# Patient Record
Sex: MALE | Race: WHITE | NOT HISPANIC OR LATINO | Employment: FULL TIME | URBAN - METROPOLITAN AREA
[De-identification: names, ages, dates, MRNs, and addresses within clinical notes are randomized per-mention and may not be internally consistent; named-entity substitution may affect disease eponyms.]

---

## 2017-09-07 ENCOUNTER — OFFICE VISIT (OUTPATIENT)
Dept: FAMILY MEDICINE CLINIC | Facility: CLINIC | Age: 32
End: 2017-09-07

## 2017-09-07 VITALS
TEMPERATURE: 98.9 F | SYSTOLIC BLOOD PRESSURE: 110 MMHG | BODY MASS INDEX: 32.35 KG/M2 | RESPIRATION RATE: 18 BRPM | WEIGHT: 226 LBS | HEART RATE: 102 BPM | OXYGEN SATURATION: 98 % | HEIGHT: 70 IN | DIASTOLIC BLOOD PRESSURE: 78 MMHG

## 2017-09-07 DIAGNOSIS — Z86.59 H/O BIPOLAR DISORDER: ICD-10-CM

## 2017-09-07 DIAGNOSIS — F41.9 ANXIETY: ICD-10-CM

## 2017-09-07 DIAGNOSIS — F32.A DEPRESSION, UNSPECIFIED DEPRESSION TYPE: ICD-10-CM

## 2017-09-07 DIAGNOSIS — Z76.89 ENCOUNTER TO ESTABLISH CARE: Primary | ICD-10-CM

## 2017-09-07 DIAGNOSIS — Z72.0 TOBACCO ABUSE: ICD-10-CM

## 2017-09-07 DIAGNOSIS — Z20.2 POSSIBLE EXPOSURE TO STD: ICD-10-CM

## 2017-09-07 DIAGNOSIS — Z87.898 PERSONAL HISTORY OF MULTIPLE PULMONARY NODULES: ICD-10-CM

## 2017-09-07 PROCEDURE — 99406 BEHAV CHNG SMOKING 3-10 MIN: CPT | Performed by: NURSE PRACTITIONER

## 2017-09-07 PROCEDURE — 99203 OFFICE O/P NEW LOW 30 MIN: CPT | Performed by: NURSE PRACTITIONER

## 2017-09-07 RX ORDER — BUSPIRONE HYDROCHLORIDE 5 MG/1
5 TABLET ORAL 2 TIMES DAILY
Qty: 60 TABLET | Refills: 0 | Status: SHIPPED | OUTPATIENT
Start: 2017-09-07 | End: 2017-09-14 | Stop reason: SDUPTHER

## 2017-09-07 NOTE — PROGRESS NOTES
Subjective   Jose Conway is a 31 y.o. male.   Mr. Conway presents today to establish care and for evaluation/management of anxiety. The patient reports that he has had anxiety issues since childhood and was at one time diagnosed with bipolar disorder. Mr. Conway states that his anxiety has been worsening recently and has begun to interfere in his ability to work and perform daily activities.    Anxiety   Presents for initial visit. Onset was more than 5 years ago. The problem has been gradually worsening. Symptoms include decreased concentration, depressed mood, excessive worry, insomnia, malaise, nausea, nervous/anxious behavior, panic and restlessness. Patient reports no chest pain, compulsions, confusion, dizziness, dry mouth, feeling of choking, hyperventilation, irritability, obsessions, palpitations, shortness of breath or suicidal ideas. Symptoms occur most days. The severity of symptoms is moderate and interfering with daily activities. The quality of sleep is poor.     Risk factors include family history. His past medical history is significant for anxiety/panic attacks and depression. There is no history of suicide attempts. Past treatments include nothing.   Depression   Visit Type: initial  Onset of symptoms: more than 5 years ago  Progression since onset: gradually worsening  Patient presents with the following symptoms: decreased concentration, depressed mood, excessive worry, fatigue, insomnia, malaise, nervousness/anxiety, panic and restlessness.  Patient is not experiencing: chest pain, choking sensation, compulsions, confusion, dry mouth, hyperventilation, irritability, obsessions, palpitations, shortness of breath and suicidal ideas.  Frequency of symptoms: most days   Severity: moderate   Risk factors: family history  Patient has a history of: anxiety/panic attacks and bipolar disorder  Treatment tried: nothing          The following portions of the patient's history were reviewed and updated as  appropriate: allergies, current medications, past family history, past medical history, past social history, past surgical history and problem list.    Review of Systems   Constitutional: Positive for activity change, appetite change and fatigue. Negative for chills, diaphoresis, fever and irritability.   HENT: Negative.    Eyes: Negative.    Respiratory: Negative for cough, choking, chest tightness and shortness of breath.    Cardiovascular: Negative for chest pain, palpitations and leg swelling.   Gastrointestinal: Positive for diarrhea (when feels anxious) and nausea. Negative for abdominal pain and vomiting.   Endocrine: Negative.    Genitourinary: Negative.    Musculoskeletal: Negative.    Skin: Negative.    Allergic/Immunologic: Negative.    Neurological: Negative for dizziness, light-headedness and headaches.   Hematological: Negative.    Psychiatric/Behavioral: Positive for decreased concentration. Negative for confusion, self-injury and suicidal ideas. The patient is nervous/anxious and has insomnia.        Objective   Physical Exam   Constitutional: He is oriented to person, place, and time. He appears well-developed and well-nourished. He is cooperative. No distress.   HENT:   Head: Normocephalic and atraumatic.   Right Ear: Tympanic membrane and external ear normal.   Left Ear: Tympanic membrane and external ear normal.   Nose: Nose normal.   Mouth/Throat: Uvula is midline, oropharynx is clear and moist and mucous membranes are normal.   Eyes: Pupils are equal, round, and reactive to light. No scleral icterus.   Neck: Normal range of motion. Neck supple. No thyromegaly present.   Cardiovascular: Regular rhythm, S1 normal, S2 normal, normal heart sounds and intact distal pulses.    Mild tachycardia   Pulmonary/Chest: Effort normal and breath sounds normal. No respiratory distress. He has no decreased breath sounds. He has no wheezes. He has no rhonchi. He has no rales.   Abdominal: Soft. He exhibits no  distension and no mass. Bowel sounds are decreased. There is no tenderness. There is no rigidity, no rebound, no guarding, no CVA tenderness and negative Belcher's sign.   Lymphadenopathy:     He has no cervical adenopathy.   Neurological: He is alert and oriented to person, place, and time.   Skin: Skin is warm and dry. He is not diaphoretic.   Psychiatric: His speech is normal and behavior is normal. Judgment and thought content normal. His mood appears anxious. His affect is not angry, not blunt, not labile and not inappropriate. He is not actively hallucinating. Cognition and memory are normal. He does not exhibit a depressed mood. He is attentive.   Vitals reviewed.      Assessment/Plan   Jose was seen today for establish care.    Diagnoses and all orders for this visit:    Encounter to establish care  -     Comprehensive Metabolic Panel  -     TSH  -     CBC Auto Differential  -     Hemoglobin A1c  -     Lipid Panel  -     POC Urinalysis Dipstick, Automated  -     Vitamin B12  -     Vitamin D 25 Hydroxy  -     Hepatitis Panel, Acute  -     HIV-1 / O / 2 Ag / Antibody 4th Generation  -     RPR  -     Chlamydia trachomatis, Neisseria gonorrhoeae, Trichomonas vaginalis, PCR  -     Vitamin B1, Whole Blood; Future    Anxiety  -     busPIRone (BUSPAR) 5 MG tablet; Take 1 tablet by mouth 2 (Two) Times a Day.  -     sertraline (ZOLOFT) 50 MG tablet; Take 1 tablet by mouth Daily.  -     Ambulatory Referral to Behavioral Health    Depression, unspecified depression type  -     sertraline (ZOLOFT) 50 MG tablet; Take 1 tablet by mouth Daily.  -     Ambulatory Referral to Behavioral Health    Tobacco abuse  -     XR Chest PA & Lateral; Future    Personal history of multiple pulmonary nodules  -     XR Chest PA & Lateral; Future    H/O bipolar disorder  -     Ambulatory Referral to [facility]al [facility]    Possible exposure to STD  -     Hepatitis Panel, Acute  -     HIV-1 / O / 2 Ag / Antibody 4th Generation  -     RPR  -      Chlamydia trachomatis, Neisseria gonorrhoeae, Trichomonas vaginalis, PCR    Discussed the nature of the medical conditions including, risks, complications, implications, management, safe and proper use of medications. Encouraged therapeutic lifestyle changes including low calorie diet with plenty of fruits and vegetables, daily exercise, medication compliance, and keeping scheduled follow up appointments with me and any other providers. Encouraged patient to have appointment for complete physical, fasting labs, appropriate screenings, and immunizations on an annual basis.    I personally spent over half of a total 40 minutes face to face with the patient in counseling and discussion and/or coordination of care as described above.     Encouraged smoking cessation. Patient counseled for 3-10 minutes on the risks, complications and implications of long term use including heart disease, stroke and increased risk of many cancers. Offered behavioral therapy, social support, and medication therapies. Patient will consider and discuss at next visit.

## 2017-09-07 NOTE — PATIENT INSTRUCTIONS
Generalized Anxiety Disorder  Generalized anxiety disorder (ROSA) is a mental disorder. It interferes with life functions, including relationships, work, and school.  ROSA is different from normal anxiety, which everyone experiences at some point in their lives in response to specific life events and activities. Normal anxiety actually helps us prepare for and get through these life events and activities. Normal anxiety goes away after the event or activity is over.   ROSA causes anxiety that is not necessarily related to specific events or activities. It also causes excess anxiety in proportion to specific events or activities. The anxiety associated with ROSA is also difficult to control. ROSA can vary from mild to severe. People with severe ROSA can have intense waves of anxiety with physical symptoms (panic attacks).   SYMPTOMS  The anxiety and worry associated with ROSA are difficult to control. This anxiety and worry are related to many life events and activities and also occur more days than not for 6 months or longer. People with ROSA also have three or more of the following symptoms (one or more in children):  · Restlessness.    · Fatigue.  · Difficulty concentrating.    · Irritability.  · Muscle tension.  · Difficulty sleeping or unsatisfying sleep.  DIAGNOSIS  ROSA is diagnosed through an assessment by your health care provider. Your health care provider will ask you questions about your mood, physical symptoms, and events in your life. Your health care provider may ask you about your medical history and use of alcohol or drugs, including prescription medicines. Your health care provider may also do a physical exam and blood tests. Certain medical conditions and the use of certain substances can cause symptoms similar to those associated with ROSA. Your health care provider may refer you to a mental health specialist for further evaluation.  TREATMENT  The following therapies are usually used to treat ROSA:    · Medication. Antidepressant medication usually is prescribed for long-term daily control. Antianxiety medicines may be added in severe cases, especially when panic attacks occur.    · Talk therapy (psychotherapy). Certain types of talk therapy can be helpful in treating ROSA by providing support, education, and guidance. A form of talk therapy called cognitive behavioral therapy can teach you healthy ways to think about and react to daily life events and activities.  · Stress management techniques. These include yoga, meditation, and exercise and can be very helpful when they are practiced regularly.  A mental health specialist can help determine which treatment is best for you. Some people see improvement with one therapy. However, other people require a combination of therapies.     This information is not intended to replace advice given to you by your health care provider. Make sure you discuss any questions you have with your health care provider.     Document Released: 04/14/2014 Document Revised: 01/08/2016 Document Reviewed: 04/14/2014  [employer] Interactive Patient Education ©2017 [employer]  Health Maintenance, Male  A healthy lifestyle and preventative care can promote health and wellness.  · Maintain regular health, dental, and eye exams.  · Eat a healthy diet. Foods like vegetables, fruits, whole grains, low-fat dairy products, and lean protein foods contain the nutrients you need and are low in calories. Decrease your intake of foods high in solid fats, added sugars, and salt. Get information about a proper diet from your health care provider, if necessary.  · Regular physical exercise is one of the most important things you can do for your health. Most adults should get at least 150 minutes of moderate-intensity exercise (any activity that increases your heart rate and causes you to sweat) each week. In addition, most adults need muscle-strengthening exercises on 2 or more days a week.     · Maintain a healthy weight. The body mass index (BMI) is a screening tool to identify possible weight problems. It provides an estimate of body fat based on height and weight. Your health care provider can find your BMI and can help you achieve or maintain a healthy weight. For males 20 years and older:    A BMI below 18.5 is considered underweight.    A BMI of 18.5 to 24.9 is normal.    A BMI of 25 to 29.9 is considered overweight.    A BMI of 30 and above is considered obese.  · Maintain normal blood lipids and cholesterol by exercising and minimizing your intake of saturated fat. Eat a balanced diet with plenty of fruits and vegetables. Blood tests for lipids and cholesterol should begin at age 20 and be repeated every 5 years. If your lipid or cholesterol levels are high, you are over age 50, or you are at high risk for heart disease, you may need your cholesterol levels checked more frequently. Ongoing high lipid and cholesterol levels should be treated with medicines if diet and exercise are not working.  · If you smoke, find out from your health care provider how to quit. If you do not use tobacco, do not start.  · Lung cancer screening is recommended for adults aged 55-80 years who are at high risk for developing lung cancer because of a history of smoking. A yearly low-dose CT scan of the lungs is recommended for people who have at least a 30-pack-year history of smoking and are current smokers or have quit within the past 15 years. A pack year of smoking is smoking an average of 1 pack of cigarettes a day for 1 year (for example, a 30-pack-year history of smoking could mean smoking 1 pack a day for 30 years or 2 packs a day for 15 years). Yearly screening should continue until the smoker has stopped smoking for at least 15 years. Yearly screening should be stopped for people who develop a health problem that would prevent them from having lung cancer treatment.  · If you choose to drink alcohol, do not  have more than 2 drinks per day. One drink is considered to be 12 oz (360 mL) of beer, 5 oz (150 mL) of wine, or 1.5 oz (45 mL) of liquor.  · Avoid the use of street drugs. Do not share needles with anyone. Ask for help if you need support or instructions about stopping the use of drugs.  · High blood pressure causes heart disease and increases the risk of stroke. High blood pressure is more likely to develop in:    People who have blood pressure in the end of the normal range (100-139/85-89 mm Hg).    People who are overweight or obese.    People who are .  · If you are 18-39 years of age, have your blood pressure checked every 3-5 years. If you are 40 years of age or older, have your blood pressure checked every year. You should have your blood pressure measured twice--once when you are at a hospital or clinic, and once when you are not at a hospital or clinic. Record the average of the two measurements. To check your blood pressure when you are not at a hospital or clinic, you can use:    An automated blood pressure machine at a pharmacy.    A home blood pressure monitor.  · If you are 45-79 years old, ask your health care provider if you should take aspirin to prevent heart disease.  · Diabetes screening involves taking a blood sample to check your fasting blood sugar level. This should be done once every 3 years after age 45 if you are at a normal weight and without risk factors for diabetes. Testing should be considered at a younger age or be carried out more frequently if you are overweight and have at least 1 risk factor for diabetes.  · Colorectal cancer can be detected and often prevented. Most routine colorectal cancer screening begins at the age of 50 and continues through age 75. However, your health care provider may recommend screening at an earlier age if you have risk factors for colon cancer. On a yearly basis, your health care provider may provide home test kits to check for hidden  blood in the stool. A small camera at the end of a tube may be used to directly examine the colon (sigmoidoscopy or colonoscopy) to detect the earliest forms of colorectal cancer. Talk to your health care provider about this at age 50 when routine screening begins. A direct exam of the colon should be repeated every 5-10 years through age 75, unless early forms of precancerous polyps or small growths are found.  · People who are at an increased risk for hepatitis B should be screened for this virus. You are considered at high risk for hepatitis B if:    You were born in a country where hepatitis B occurs often. Talk with your health care provider about which countries are considered high risk.    Your parents were born in a high-risk country and you have not received a shot to protect against hepatitis B (hepatitis B vaccine).    You have HIV or AIDS.    You use needles to inject street drugs.    You live with, or have sex with, someone who has hepatitis B.    You are a man who has sex with other men (MSM).    You get hemodialysis treatment.    You take certain medicines for conditions like cancer, organ transplantation, and autoimmune conditions.  · Hepatitis C blood testing is recommended for all people born from 1945 through 1965 and any individual with known risk factors for hepatitis C.  · Healthy men should no longer receive prostate-specific antigen (PSA) blood tests as part of routine cancer screening. Talk to your health care provider about prostate cancer screening.  · Testicular cancer screening is not recommended for adolescents or adult males who have no symptoms. Screening includes self-exam, a health care provider exam, and other screening tests. Consult with your health care provider about any symptoms you have or any concerns you have about testicular cancer.  · Practice safe sex. Use condoms and avoid high-risk sexual practices to reduce the spread of sexually transmitted infections (STIs).  · You  should be screened for STIs, including gonorrhea and chlamydia if:    You are sexually active and are younger than 24 years.    You are older than 24 years, and your health care provider tells you that you are at risk for this type of infection.    Your sexual activity has changed since you were last screened, and you are at an increased risk for chlamydia or gonorrhea. Ask your health care provider if you are at risk.  · If you are at risk of being infected with HIV, it is recommended that you take a prescription medicine daily to prevent HIV infection. This is called pre-exposure prophylaxis (PrEP). You are considered at risk if:    You are a man who has sex with other men (MSM).    You are a heterosexual man who is sexually active with multiple partners.    You take drugs by injection.    You are sexually active with a partner who has HIV.    Talk with your health care provider about whether you are at high risk of being infected with HIV. If you choose to begin PrEP, you should first be tested for HIV. You should then be tested every 3 months for as long as you are taking PrEP.  · Use sunscreen. Apply sunscreen liberally and repeatedly throughout the day. You should seek shade when your shadow is shorter than you. Protect yourself by wearing long sleeves, pants, a wide-brimmed hat, and sunglasses year round whenever you are outdoors.  · Tell your health care provider of new moles or changes in moles, especially if there is a change in shape or color. Also, tell your health care provider if a mole is larger than the size of a pencil eraser.  · A one-time screening for abdominal aortic aneurysm (AAA) and surgical repair of large AAAs by ultrasound is recommended for men aged 65-75 years who are current or former smokers.  · Stay current with your vaccines (immunizations).     This information is not intended to replace advice given to you by your health care provider. Make sure you discuss any questions you have  with your health care provider.     Document Released: 06/15/2009 Document Revised: 01/08/2016 Document Reviewed: 09/20/2016  [employer] Interactive Patient Education ©2017 [employer]  Major Depressive Disorder  Major depressive disorder is a mental illness. It also may be called clinical depression or unipolar depression. Major depressive disorder usually causes feelings of sadness, hopelessness, or helplessness. Some people with this disorder do not feel particularly sad but lose interest in doing things they used to enjoy (anhedonia). Major depressive disorder also can cause physical symptoms. It can interfere with work, school, relationships, and other normal everyday activities. The disorder varies in severity but is longer lasting and more serious than the sadness we all feel from time to time in our lives.  Major depressive disorder often is triggered by stressful life events or major life changes. Examples of these triggers include divorce, loss of your job or home, a move, and the death of a family member or close friend. Sometimes this disorder occurs for no obvious reason at all. People who have family members with major depressive disorder or bipolar disorder are at higher risk for developing this disorder, with or without life stressors. Major depressive disorder can occur at any age. It may occur just once in your life (single episode major depressive disorder). It may occur multiple times (recurrent major depressive disorder).  SYMPTOMS  People with major depressive disorder have either anhedonia or depressed mood on nearly a daily basis for at least 2 weeks or longer. Symptoms of depressed mood include:  · Feelings of sadness (blue or down in the dumps) or emptiness.  · Feelings of hopelessness or helplessness.  · Tearfulness or episodes of crying (may be observed by others).  · Irritability (children and adolescents).  In addition to depressed mood or anhedonia or both, people with this disorder  have at least four of the following symptoms:  · Difficulty sleeping or sleeping too much.    · Significant change (increase or decrease) in appetite or weight.    · Lack of energy or motivation.  · Feelings of guilt and worthlessness.    · Difficulty concentrating, remembering, or making decisions.  · Unusually slow movement (psychomotor retardation) or restlessness (as observed by others).    · Recurrent wishes for death, recurrent thoughts of self-harm (suicide), or a suicide attempt.  People with major depressive disorder commonly have persistent negative thoughts about themselves, other people, and the world. People with severe major depressive disorder may experience distorted beliefs or perceptions about the world (psychotic delusions). They also may see or hear things that are not real (psychotic hallucinations).  DIAGNOSIS  Major depressive disorder is diagnosed through an assessment by your health care provider. Your health care provider will ask about aspects of your daily life, such as mood, sleep, and appetite, to see if you have the diagnostic symptoms of major depressive disorder. Your health care provider may ask about your medical history and use of alcohol or drugs, including prescription medicines. Your health care provider also may do a physical exam and blood work. This is because certain medical conditions and the use of certain substances can cause major depressive disorder-like symptoms (secondary depression). Your health care provider also may refer you to a mental health specialist for further evaluation and treatment.  TREATMENT  It is important to recognize the symptoms of major depressive disorder and seek treatment. The following treatments can be prescribed for this disorder:    · Medicine. Antidepressant medicines usually are prescribed. Antidepressant medicines are thought to correct chemical imbalances in the brain that are commonly associated with major depressive disorder. Other  types of medicine may be added if the symptoms do not respond to antidepressant medicines alone or if psychotic delusions or hallucinations occur.  · Talk therapy. Talk therapy can be helpful in treating major depressive disorder by providing support, education, and guidance. Certain types of talk therapy also can help with negative thinking (cognitive behavioral therapy) and with relationship issues that trigger this disorder (interpersonal therapy).  A mental health specialist can help determine which treatment is best for you. Most people with major depressive disorder do well with a combination of medicine and talk therapy. Treatments involving electrical stimulation of the brain can be used in situations with extremely severe symptoms or when medicine and talk therapy do not work over time. These treatments include electroconvulsive therapy, transcranial magnetic stimulation, and vagal nerve stimulation.     This information is not intended to replace advice given to you by your health care provider. Make sure you discuss any questions you have with your health care provider.     Document Released: 04/14/2014 Document Revised: 01/08/2016 Document Reviewed: 04/14/2014  [employer] Interactive Patient Education ©2017 [employer]    Pulmonary Nodule  A pulmonary nodule is a small, round growth of tissue in the lung. Pulmonary nodules can range in size from less than 1/5 inch (4 mm) to a little bigger than an inch (25 mm). Most pulmonary nodules are detected when imaging tests of the lung are being performed for a different problem. Pulmonary nodules are usually not cancerous (benign). However, some pulmonary nodules are cancerous (malignant). Follow-up treatment or testing is based on the size of the pulmonary nodule and your risk of getting lung cancer.   CAUSES  Benign pulmonary nodules can be caused by various things. Some of the causes include:   · Bacterial, fungal, or viral infections. This is usually an old  infection that is no longer active, but it can sometimes be a current, active infection.  · A benign mass of tissue.  · Inflammation from conditions such as rheumatoid arthritis.    · Abnormal blood vessels in the lungs.  Malignant pulmonary nodules can result from lung cancer or from cancers that spread to the lung from other places in the body.  SIGNS AND SYMPTOMS  Pulmonary nodules usually do not cause symptoms.  DIAGNOSIS  Most often, pulmonary nodules are found incidentally when an X-ray or CT scan is performed to look for some other problem in the lung area. To help determine whether a pulmonary nodule is benign or malignant, your health care provider will take a medical history and order a variety of tests. Tests done may include:   · Blood tests.  · A skin test called a tuberculin test. This test is used to determine if you have been exposed to the germ that causes tuberculosis.    · Chest X-rays. If possible, a new X-ray may be compared with X-rays you have had in the past.     · CT scan. This test shows smaller pulmonary nodules more clearly than an X-ray.    · Positron emission tomography (PET) scan. In this test, a safe amount of a radioactive substance is injected into the bloodstream. Then, the scan takes a picture of the pulmonary nodule. The radioactive substance is eliminated from your body in your urine.    · Biopsy. A tiny piece of the pulmonary nodule is removed so it can be checked under a microscope.  TREATMENT   Pulmonary nodules that are benign normally do not require any treatment because they usually do not cause symptoms or breathing problems. Your health care provider may want to monitor the pulmonary nodule through follow-up CT scans. The frequency of these CT scans will vary based on the size of the nodule and the risk factors for lung cancer. For example, CT scans will need to be done more frequently if the pulmonary nodule is larger and if you have a history of smoking and a family  history of cancer. Further testing or biopsies may be done if any follow-up CT scan shows that the size of the pulmonary nodule has increased.  HOME CARE INSTRUCTIONS  · Only take over-the-counter or prescription medicines as directed by your health care provider.  · Keep all follow-up appointments with your health care provider.  SEEK MEDICAL CARE IF:  · You have trouble breathing when you are active.    · You feel sick or unusually tired.    · You do not feel like eating.    · You lose weight without trying to.    · You develop chills or night sweats.    SEEK IMMEDIATE MEDICAL CARE IF:  · You cannot catch your breath, or you begin wheezing.    · You cannot stop coughing.    · You cough up blood.    · You become dizzy or feel like you are going to pass out.    · You have sudden chest pain.    · You have a fever or persistent symptoms for more than 2-3 days.    · You have a fever and your symptoms suddenly get worse.  MAKE SURE YOU:  · Understand these instructions.  · Will watch your condition.  · Will get help right away if you are not doing well or get worse.     This information is not intended to replace advice given to you by your health care provider. Make sure you discuss any questions you have with your health care provider.     Document Released: 10/15/2010 Document Revised: 08/20/2014 Document Reviewed: 06/09/2014  [employer] Interactive Patient Education ©2017 [employer]    Smoking Hazards  Smoking cigarettes is extremely bad for your health. Tobacco smoke has over 200 known poisons in it. It contains the poisonous gases nitrogen oxide and carbon monoxide. There are over 60 chemicals in tobacco smoke that cause cancer. Some of the chemicals found in cigarette smoke include:   · Cyanide.    · Benzene.    · Formaldehyde.    · Methanol (wood alcohol).    · Acetylene (fuel used in welding torches).    · Ammonia.    Even smoking lightly shortens your life expectancy by several years. You can greatly reduce  the risk of medical problems for you and your family by stopping now. Smoking is the most preventable cause of death and disease in our society. Within days of quitting smoking, your circulation improves, you decrease the risk of having a heart attack, and your lung capacity improves. There may be some increased phlegm in the first few days after quitting, and it may take months for your lungs to clear up completely. Quitting for 10 years reduces your risk of developing lung cancer to almost that of a nonsmoker.   WHAT ARE THE RISKS OF SMOKING?  Cigarette smokers have an increased risk of many serious medical problems, including:  · Lung cancer.    · Lung disease (such as pneumonia, bronchitis, and emphysema).    · Heart attack and chest pain due to the heart not getting enough oxygen (angina).    · Heart disease and peripheral blood vessel disease.    · Hypertension.    · Stroke.    · Oral cancer (cancer of the lip, mouth, or voice box).    · Bladder cancer.    · Pancreatic cancer.    · Cervical cancer.    · Pregnancy complications, including premature birth.    · Stillbirths and smaller  babies, birth defects, and genetic damage to sperm.    · Early menopause.    · Lower estrogen level for women.    · Infertility.    · Facial wrinkles.    · Blindness.    · Increased risk of broken bones (fractures).    · Senile dementia.    · Stomach ulcers and internal bleeding.    · Delayed wound healing and increased risk of complications during surgery.  Because of secondhand smoke exposure, children of smokers have an increased risk of the following:   · Sudden infant death syndrome (SIDS).    · Respiratory infections.    · Lung cancer.    · Heart disease.    · Ear infections.    WHY IS SMOKING ADDICTIVE?  Nicotine is the chemical agent in tobacco that is capable of causing addiction or dependence. When you smoke and inhale, nicotine is absorbed rapidly into the bloodstream through your lungs. Both inhaled and  noninhaled nicotine may be addictive.   WHAT ARE THE BENEFITS OF QUITTING?   There are many health benefits to quitting smoking. Some are:   · The likelihood of developing cancer and heart disease decreases. Health improvements are seen almost immediately.    · Blood pressure, pulse rate, and breathing patterns start returning to normal soon after quitting.    · People who quit may see an improvement in their overall quality of life.    HOW DO YOU QUIT SMOKING?  Smoking is an addiction with both physical and psychological effects, and longtime habits can be hard to change. Your health care provider can recommend:  · Programs and community resources, which may include group support, education, or therapy.  · Replacement products, such as patches, gum, and nasal sprays. Use these products only as directed. Do not replace cigarette smoking with electronic cigarettes (commonly called e-cigarettes). The safety of e-cigarettes is unknown, and some may contain harmful chemicals.  FOR MORE INFORMATION  · American Lung Association: www.lung.org  · American Cancer Society: www.cancer.org     This information is not intended to replace advice given to you by your health care provider. Make sure you discuss any questions you have with your health care provider.     Document Released: 01/25/2006 Document Revised: 04/10/2017 Document Reviewed: 06/09/2014  [employer] Patient Education ©2017 [employer]  Buspirone tablets  What is this medicine?  BUSPIRONE (byspencer duran) is used to treat anxiety disorders.  This medicine may be used for other purposes; ask your health care provider or pharmacist if you have questions.  COMMON BRAND NAME(S): BuSpar  What should I tell my health care provider before I take this medicine?  They need to know if you have any of these conditions:  -kidney or liver disease  -an unusual or allergic reaction to buspirone, other medicines, foods, dyes, or preservatives  -pregnant or trying to  get pregnant  -breast-feeding  How should I use this medicine?  Take this medicine by mouth with a glass of water. Follow the directions on the prescription label. You may take this medicine with or without food. To ensure that this medicine always works the same way for you, you should take it either always with or always without food. Take your doses at regular intervals. Do not take your medicine more often than directed. Do not stop taking except on the advice of your doctor or health care professional.  Talk to your pediatrician regarding the use of this medicine in children. Special care may be needed.  Overdosage: If you think you have taken too much of this medicine contact a poison control center or emergency room at once.  NOTE: This medicine is only for you. Do not share this medicine with others.  What if I miss a dose?  If you miss a dose, take it as soon as you can. If it is almost time for your next dose, take only that dose. Do not take double or extra doses.  What may interact with this medicine?  Do not take this medicine with any of the following medications:  -linezolid  -MAOIs like Carbex, Eldepryl, Marplan, Nardil, and Parnate  -methylene blue  -procarbazine  This medicine may also interact with the following medications:  -diazepam  -digoxin  -diltiazem  -erythromycin  -grapefruit juice  -haloperidol  -medicines for mental depression or mood problems  -medicines for seizures like carbamazepine, phenobarbital and phenytoin  -nefazodone  -other medications for anxiety  -rifampin  -ritonavir  -some antifungal medicines like itraconazole, ketoconazole, and voriconazole  -verapamil  -warfarin  This list may not describe all possible interactions. Give your health care provider a list of all the medicines, herbs, non-prescription drugs, or dietary supplements you use. Also tell them if you smoke, drink alcohol, or use illegal drugs. Some items may interact with your medicine.  What should I watch  for while using this medicine?  Visit your doctor or health care professional for regular checks on your progress. It may take 1 to 2 weeks before your anxiety gets better.  You may get drowsy or dizzy. Do not drive, use machinery, or do anything that needs mental alertness until you know how this drug affects you. Do not stand or sit up quickly, especially if you are an older patient. This reduces the risk of dizzy or fainting spells. Alcohol can make you more drowsy and dizzy. Avoid alcoholic drinks.  What side effects may I notice from receiving this medicine?  Side effects that you should report to your doctor or health care professional as soon as possible:  -blurred vision or other vision changes  -chest pain  -confusion  -difficulty breathing  -feelings of hostility or anger  -muscle aches and pains  -numbness or tingling in hands or feet  -ringing in the ears  -skin rash and itching  -vomiting  -weakness  Side effects that usually do not require medical attention (report to your doctor or health care professional if they continue or are bothersome):  -disturbed dreams, nightmares  -headache  -nausea  -restlessness or nervousness  -sore throat and nasal congestion  -stomach upset  This list may not describe all possible side effects. Call your doctor for medical advice about side effects. You may report side effects to FDA at 3-471-FDA-0256.  Where should I keep my medicine?  Keep out of the reach of children.  Store at room temperature below 30 degrees C (86 degrees F). Protect from light. Keep container tightly closed. Throw away any unused medicine after the expiration date.  NOTE: This sheet is a summary. It may not cover all possible information. If you have questions about this medicine, talk to your doctor, pharmacist, or health care provider.     © 2017, [employer]. (2011-07-28 18:06:11)  Sertraline tablets  What is this medicine?  SERTRALINE (SER tra li) is used to treat depression. It  may also be used to treat obsessive compulsive disorder, panic disorder, post-trauma stress, premenstrual dysphoric disorder (PMDD) or social anxiety.  This medicine may be used for other purposes; ask your health care provider or pharmacist if you have questions.  COMMON BRAND NAME(S): Zoloft  What should I tell my health care provider before I take this medicine?  They need to know if you have any of these conditions:  -bleeding disorders  -bipolar disorder or a family history of bipolar disorder  -glaucoma  -heart disease  -high blood pressure  -history of irregular heartbeat  -history of low levels of calcium, magnesium, or potassium in the blood  -if you often drink alcohol  -liver disease  -receiving electroconvulsive therapy  -seizures  -suicidal thoughts, plans, or attempt; a previous suicide attempt by you or a family member  -take medicines that treat or prevent blood clots  -thyroid disease  -an unusual or allergic reaction to sertraline, other medicines, foods, dyes, or preservatives  -pregnant or trying to get pregnant  -breast-feeding  How should I use this medicine?  Take this medicine by mouth with a glass of water. Follow the directions on the prescription label. You can take it with or without food. Take your medicine at regular intervals. Do not take your medicine more often than directed. Do not stop taking this medicine suddenly except upon the advice of your doctor. Stopping this medicine too quickly may cause serious side effects or your condition may worsen.  A special MedGuide will be given to you by the pharmacist with each prescription and refill. Be sure to read this information carefully each time.  Talk to your pediatrician regarding the use of this medicine in children. While this drug may be prescribed for children as young as 7 years for selected conditions, precautions do apply.  Overdosage: If you think you have taken too much of this medicine contact a poison control center or  emergency room at once.  NOTE: This medicine is only for you. Do not share this medicine with others.  What if I miss a dose?  If you miss a dose, take it as soon as you can. If it is almost time for your next dose, take only that dose. Do not take double or extra doses.  What may interact with this medicine?  Do not take this medicine with any of the following medications:  -certain medicines for fungal infections like fluconazole, itraconazole, ketoconazole, posaconazole, voriconazole  -cisapride  -disulfiram  -dofetilide  -linezolid  -MAOIs like Carbex, Eldepryl, Marplan, Nardil, and Parnate  -metronidazole  -methylene blue (injected into a vein)  -pimozide  -thioridazine  -ziprasidone  This medicine may also interact with the following medications:  -alcohol  -amphetamines  -aspirin and aspirin-like medicines  -certain medicines for depression, anxiety, or psychotic disturbances  -certain medicines for irregular heart beat like flecainide, propafenone  -certain medicines for migraine headaches like almotriptan, eletriptan, frovatriptan, naratriptan, rizatriptan, sumatriptan, zolmitriptan  -certain medicines for sleep  -certain medicines for seizures like carbamazepine, valproic acid, phenytoin  -certain medicines that treat or prevent blood clots like warfarin, enoxaparin, dalteparin  -cimetidine  -digoxin  -diuretics  -fentanyl  -furazolidone  -isoniazid  -lithium  -NSAIDs, medicines for pain and inflammation, like ibuprofen or naproxen  -other medicines that prolong the QT interval (cause an abnormal heart rhythm)  -procarbazine  -rasagiline  -supplements like [place] wort, kava kava, valerian  -tolbutamide  -tramadol  -tryptophan  This list may not describe all possible interactions. Give your health care provider a list of all the medicines, herbs, non-prescription drugs, or dietary supplements you use. Also tell them if you smoke, drink alcohol, or use illegal drugs. Some items may interact with your  medicine.  What should I watch for while using this medicine?  Tell your doctor if your symptoms do not get better or if they get worse. Visit your doctor or health care professional for regular checks on your progress. Because it may take several weeks to see the full effects of this medicine, it is important to continue your treatment as prescribed by your doctor.  Patients and their families should watch out for new or worsening thoughts of suicide or depression. Also watch out for sudden changes in feelings such as feeling anxious, agitated, panicky, irritable, hostile, aggressive, impulsive, severely restless, overly excited and hyperactive, or not being able to sleep. If this happens, especially at the beginning of treatment or after a change in dose, call your health care professional.  You may get drowsy or dizzy. Do not drive, use machinery, or do anything that needs mental alertness until you know how this medicine affects you. Do not stand or sit up quickly, especially if you are an older patient. This reduces the risk of dizzy or fainting spells. Alcohol may interfere with the effect of this medicine. Avoid alcoholic drinks.  Your mouth may get dry. Chewing sugarless gum or sucking hard candy, and drinking plenty of water may help. Contact your doctor if the problem does not go away or is severe.  What side effects may I notice from receiving this medicine?  Side effects that you should report to your doctor or health care professional as soon as possible:  -allergic reactions like skin rash, itching or hives, swelling of the face, lips, or tongue  -black, tarry stools  -changes in vision  -confusion  -eye pain  -fast, irregular heartbeat  -feeling faint or lightheaded, falls  -hallucination, loss of contact with reality  -loss of balance or coordination  -loss of memory  -restlessness, pacing, inability to keep still  -seizures  -stiff muscles  -suicidal thoughts or other mood changes  -trouble  sleeping  -unusual bleeding or bruising  -unusually weak or tired  -vomiting  Side effects that usually do not require medical attention (report to your doctor or health care professional if they continue or are bothersome):  -anxious  -change in appetite or weight  -change in sex drive or performance  -diarrhea  -increased sweating  -indigestion, nausea  -tremors  This list may not describe all possible side effects. Call your doctor for medical advice about side effects. You may report side effects to FDA at 9-909-YJC-8315.  Where should I keep my medicine?  Keep out of the reach of children.  Store at room temperature between 15 and 30 degrees C (59 and 86 degrees F). Throw away any unused medicine after the expiration date.  NOTE: This sheet is a summary. It may not cover all possible information. If you have questions about this medicine, talk to your doctor, pharmacist, or health care provider.     © 2017, [employer]. (2017-02-07 11:57:26)

## 2017-09-08 ENCOUNTER — HOSPITAL ENCOUNTER (OUTPATIENT)
Dept: GENERAL RADIOLOGY | Facility: HOSPITAL | Age: 32
Discharge: HOME | End: 2017-09-08
Admitting: NURSE PRACTITIONER

## 2017-09-08 DIAGNOSIS — Z72.0 TOBACCO ABUSE: ICD-10-CM

## 2017-09-08 DIAGNOSIS — Z87.898 PERSONAL HISTORY OF MULTIPLE PULMONARY NODULES: ICD-10-CM

## 2017-09-08 PROCEDURE — 71020 HC CHEST PA AND LATERAL: CPT

## 2017-09-10 DIAGNOSIS — R91.1 PULMONARY NODULE: Primary | ICD-10-CM

## 2017-09-11 DIAGNOSIS — R91.1 LUNG NODULE: Primary | ICD-10-CM

## 2017-09-12 ENCOUNTER — RESULTS ENCOUNTER (OUTPATIENT)
Dept: FAMILY MEDICINE CLINIC | Facility: CLINIC | Age: 32
End: 2017-09-12

## 2017-09-12 DIAGNOSIS — Z76.89 ENCOUNTER TO ESTABLISH CARE: ICD-10-CM

## 2017-09-14 ENCOUNTER — OFFICE VISIT (OUTPATIENT)
Dept: FAMILY MEDICINE CLINIC | Facility: CLINIC | Age: 32
End: 2017-09-14

## 2017-09-14 VITALS
WEIGHT: 226 LBS | HEART RATE: 83 BPM | SYSTOLIC BLOOD PRESSURE: 110 MMHG | BODY MASS INDEX: 32.35 KG/M2 | HEIGHT: 70 IN | DIASTOLIC BLOOD PRESSURE: 70 MMHG | TEMPERATURE: 99.7 F | OXYGEN SATURATION: 98 %

## 2017-09-14 DIAGNOSIS — M25.511 RIGHT SHOULDER PAIN, UNSPECIFIED CHRONICITY: ICD-10-CM

## 2017-09-14 DIAGNOSIS — F32.A DEPRESSION, UNSPECIFIED DEPRESSION TYPE: ICD-10-CM

## 2017-09-14 DIAGNOSIS — G47.00 INSOMNIA, UNSPECIFIED TYPE: ICD-10-CM

## 2017-09-14 DIAGNOSIS — R06.83 SNORING: ICD-10-CM

## 2017-09-14 DIAGNOSIS — R19.7 DIARRHEA, UNSPECIFIED TYPE: ICD-10-CM

## 2017-09-14 DIAGNOSIS — J06.9 UPPER RESPIRATORY TRACT INFECTION, UNSPECIFIED TYPE: ICD-10-CM

## 2017-09-14 DIAGNOSIS — F41.9 ANXIETY: Primary | ICD-10-CM

## 2017-09-14 PROCEDURE — 99213 OFFICE O/P EST LOW 20 MIN: CPT | Performed by: NURSE PRACTITIONER

## 2017-09-14 RX ORDER — AZITHROMYCIN 250 MG/1
TABLET, FILM COATED ORAL
Qty: 6 TABLET | Refills: 0 | Status: SHIPPED | OUTPATIENT
Start: 2017-09-14 | End: 2017-09-19

## 2017-09-14 RX ORDER — TOBRAMYCIN AND DEXAMETHASONE 3; 1 MG/ML; MG/ML
SUSPENSION/ DROPS OPHTHALMIC
Refills: 0 | COMMUNITY
Start: 2017-09-08 | End: 2017-10-02 | Stop reason: HOSPADM

## 2017-09-14 RX ORDER — BUSPIRONE HYDROCHLORIDE 5 MG/1
5 TABLET ORAL 3 TIMES DAILY
Qty: 90 TABLET | Refills: 0 | Status: SHIPPED | OUTPATIENT
Start: 2017-09-14 | End: 2017-11-20 | Stop reason: SDUPTHER

## 2017-09-14 NOTE — PATIENT INSTRUCTIONS
Joint Pain  Joint pain, which is also called arthralgia, can be caused by many things. Joint pain often goes away when you follow your health care provider's instructions for relieving pain at home. However, joint pain can also be caused by conditions that require further treatment. Common causes of joint pain include:  · Bruising in the area of the joint.  · Overuse of the joint.  · Wear and tear on the joints that occur with aging (osteoarthritis).  · Various other forms of arthritis.  · A buildup of a crystal form of uric acid in the joint (gout).  · Infections of the joint (septic arthritis) or of the bone (osteomyelitis).  Your health care provider may recommend medicine to help with the pain. If your joint pain continues, additional tests may be needed to diagnose your condition.  HOME CARE INSTRUCTIONS  Watch your condition for any changes. Follow these instructions as directed to lessen the pain that you are feeling.  · Take medicines only as directed by your health care provider.  · Rest the affected area for as long as your health care provider says that you should. If directed to do so, raise the painful joint above the level of your heart while you are sitting or lying down.  · Do not do things that cause or worsen pain.  · If directed, apply ice to the painful area:    Put ice in a plastic bag.    Place a towel between your skin and the bag.    Leave the ice on for 20 minutes, 2-3 times per day.  · Wear an elastic bandage, splint, or sling as directed by your health care provider. Loosen the elastic bandage or splint if your fingers or toes become numb and tingle, or if they turn cold and blue.  · Begin exercising or stretching the affected area as directed by your health care provider. Ask your health care provider what types of exercise are safe for you.  · Keep all follow-up visits as directed by your health care provider. This is important.  SEEK MEDICAL CARE IF:  · Your pain increases, and medicine  does not help.  · Your joint pain does not improve within 3 days.  · You have increased bruising or swelling.  · You have a fever.  · You lose 10 lb (4.5 kg) or more without trying.  SEEK IMMEDIATE MEDICAL CARE IF:  · You are not able to move the joint.  · Your fingers or toes become numb or they turn cold and blue.     This information is not intended to replace advice given to you by your health care provider. Make sure you discuss any questions you have with your health care provider.     Document Released: 12/18/2006 Document Revised: 01/08/2016 Document Reviewed: 09/29/2015  [employer] Interactive Patient Education ©2017 [employer]  Major Depressive Disorder  Major depressive disorder is a mental illness. It also may be called clinical depression or unipolar depression. Major depressive disorder usually causes feelings of sadness, hopelessness, or helplessness. Some people with this disorder do not feel particularly sad but lose interest in doing things they used to enjoy (anhedonia). Major depressive disorder also can cause physical symptoms. It can interfere with work, school, relationships, and other normal everyday activities. The disorder varies in severity but is longer lasting and more serious than the sadness we all feel from time to time in our lives.  Major depressive disorder often is triggered by stressful life events or major life changes. Examples of these triggers include divorce, loss of your job or home, a move, and the death of a family member or close friend. Sometimes this disorder occurs for no obvious reason at all. People who have family members with major depressive disorder or bipolar disorder are at higher risk for developing this disorder, with or without life stressors. Major depressive disorder can occur at any age. It may occur just once in your life (single episode major depressive disorder). It may occur multiple times (recurrent major depressive disorder).  SYMPTOMS  People  with major depressive disorder have either anhedonia or depressed mood on nearly a daily basis for at least 2 weeks or longer. Symptoms of depressed mood include:  · Feelings of sadness (blue or down in the dumps) or emptiness.  · Feelings of hopelessness or helplessness.  · Tearfulness or episodes of crying (may be observed by others).  · Irritability (children and adolescents).  In addition to depressed mood or anhedonia or both, people with this disorder have at least four of the following symptoms:  · Difficulty sleeping or sleeping too much.    · Significant change (increase or decrease) in appetite or weight.    · Lack of energy or motivation.  · Feelings of guilt and worthlessness.    · Difficulty concentrating, remembering, or making decisions.  · Unusually slow movement (psychomotor retardation) or restlessness (as observed by others).    · Recurrent wishes for death, recurrent thoughts of self-harm (suicide), or a suicide attempt.  People with major depressive disorder commonly have persistent negative thoughts about themselves, other people, and the world. People with severe major depressive disorder may experience distorted beliefs or perceptions about the world (psychotic delusions). They also may see or hear things that are not real (psychotic hallucinations).  DIAGNOSIS  Major depressive disorder is diagnosed through an assessment by your health care provider. Your health care provider will ask about aspects of your daily life, such as mood, sleep, and appetite, to see if you have the diagnostic symptoms of major depressive disorder. Your health care provider may ask about your medical history and use of alcohol or drugs, including prescription medicines. Your health care provider also may do a physical exam and blood work. This is because certain medical conditions and the use of certain substances can cause major depressive disorder-like symptoms (secondary depression). Your health care provider  also may refer you to a mental health specialist for further evaluation and treatment.  TREATMENT  It is important to recognize the symptoms of major depressive disorder and seek treatment. The following treatments can be prescribed for this disorder:    · Medicine. Antidepressant medicines usually are prescribed. Antidepressant medicines are thought to correct chemical imbalances in the brain that are commonly associated with major depressive disorder. Other types of medicine may be added if the symptoms do not respond to antidepressant medicines alone or if psychotic delusions or hallucinations occur.  · Talk therapy. Talk therapy can be helpful in treating major depressive disorder by providing support, education, and guidance. Certain types of talk therapy also can help with negative thinking (cognitive behavioral therapy) and with relationship issues that trigger this disorder (interpersonal therapy).  A mental health specialist can help determine which treatment is best for you. Most people with major depressive disorder do well with a combination of medicine and talk therapy. Treatments involving electrical stimulation of the brain can be used in situations with extremely severe symptoms or when medicine and talk therapy do not work over time. These treatments include electroconvulsive therapy, transcranial magnetic stimulation, and vagal nerve stimulation.     This information is not intended to replace advice given to you by your health care provider. Make sure you discuss any questions you have with your health care provider.     Document Released: 04/14/2014 Document Revised: 01/08/2016 Document Reviewed: 04/14/2014  [employer] Interactive Patient Education ©2017 [employer]    Shoulder Pain  Many things can cause shoulder pain, including:  · An injury.  · Moving the arm in the same way again and again (overuse).  · Joint pain (arthritis).  HOME CARE  Take these actions to help with your pain:  · Squeeze  "a soft ball or a foam pad as much as you can. This helps to prevent swelling. It also makes the arm stronger.  · Take over-the-counter and prescription medicines only as told by your doctor.  · If told, put ice on the area:    Put ice in a plastic bag.    Place a towel between your skin and the bag.    Leave the ice on for 20 minutes, 2-3 times per day. Stop putting on ice if it does not help with the pain.  · If you were given a shoulder sling or immobilizer:    Wear it as told.    Remove it to shower or bathe.    Move your arm as little as possible.    Keep your hand moving. This helps prevent swelling.  GET HELP IF:  · Your pain gets worse.  · Medicine does not help your pain.  · You have new pain in your arm, hand, or fingers.  GET HELP RIGHT AWAY IF:   · Your arm, hand, or fingers:    Tingle.    Are numb.    Are swollen.    Are painful.    Turn white or blue.     This information is not intended to replace advice given to you by your health care provider. Make sure you discuss any questions you have with your health care provider.     Document Released: 06/05/2009 Document Revised: 04/10/2017 Document Reviewed: 04/11/2016  [employer] Interactive Patient Education ©2017 [employer]    Upper Respiratory Infection, Adult  Most upper respiratory infections (URIs) are a viral infection of the air passages leading to the lungs. A URI affects the nose, throat, and upper air passages. The most common type of URI is nasopharyngitis and is typically referred to as \"the common cold.\"  URIs run their course and usually go away on their own. Most of the time, a URI does not require medical attention, but sometimes a bacterial infection in the upper airways can follow a viral infection. This is called a secondary infection. Sinus and middle ear infections are common types of secondary upper respiratory infections.  Bacterial pneumonia can also complicate a URI. A URI can worsen asthma and chronic obstructive pulmonary " disease (COPD). Sometimes, these complications can require emergency medical care and may be life threatening.   CAUSES  Almost all URIs are caused by viruses. A virus is a type of germ and can spread from one person to another.   RISKS FACTORS  You may be at risk for a URI if:   · You smoke.    · You have chronic heart or lung disease.  · You have a weakened defense (immune) system.    · You are very young or very old.    · You have nasal allergies or asthma.  · You work in crowded or poorly ventilated areas.  · You work in health care facilities or schools.  SIGNS AND SYMPTOMS   Symptoms typically develop 2-3 days after you come in contact with a cold virus. Most viral URIs last 7-10 days. However, viral URIs from the influenza virus (flu virus) can last 14-18 days and are typically more severe. Symptoms may include:   · Runny or stuffy (congested) nose.    · Sneezing.    · Cough.    · Sore throat.    · Headache.    · Fatigue.    · Fever.    · Loss of appetite.    · Pain in your forehead, behind your eyes, and over your cheekbones (sinus pain).  · Muscle aches.    DIAGNOSIS   Your health care provider may diagnose a URI by:  · Physical exam.  · Tests to check that your symptoms are not due to another condition such as:  ¨ Strep throat.  ¨ Sinusitis.  ¨ Pneumonia.  ¨ Asthma.  TREATMENT   A URI goes away on its own with time. It cannot be cured with medicines, but medicines may be prescribed or recommended to relieve symptoms. Medicines may help:  · Reduce your fever.  · Reduce your cough.  · Relieve nasal congestion.  HOME CARE INSTRUCTIONS   · Take medicines only as directed by your health care provider.    · Gargle warm saltwater or take cough drops to comfort your throat as directed by your health care provider.  · Use a warm mist humidifier or inhale steam from a shower to increase air moisture. This may make it easier to breathe.  · Drink enough fluid to keep your urine clear or pale yellow.    · Eat soups and  other clear broths and maintain good nutrition.    · Rest as needed.    · Return to work when your temperature has returned to normal or as your health care provider advises. You may need to stay home longer to avoid infecting others. You can also use a face mask and careful hand washing to prevent spread of the virus.  · Increase the usage of your inhaler if you have asthma.    · Do not use any tobacco products, including cigarettes, chewing tobacco, or electronic cigarettes. If you need help quitting, ask your health care provider.  PREVENTION   The best way to protect yourself from getting a cold is to practice good hygiene.   · Avoid oral or hand contact with people with cold symptoms.    · Wash your hands often if contact occurs.    There is no clear evidence that vitamin C, vitamin E, echinacea, or exercise reduces the chance of developing a cold. However, it is always recommended to get plenty of rest, exercise, and practice good nutrition.   SEEK MEDICAL CARE IF:   · You are getting worse rather than better.    · Your symptoms are not controlled by medicine.    · You have chills.  · You have worsening shortness of breath.  · You have brown or red mucus.  · You have yellow or brown nasal discharge.  · You have pain in your face, especially when you bend forward.  · You have a fever.  · You have swollen neck glands.  · You have pain while swallowing.  · You have white areas in the back of your throat.  SEEK IMMEDIATE MEDICAL CARE IF:   · You have severe or persistent:    Headache.    Ear pain.    Sinus pain.    Chest pain.  · You have chronic lung disease and any of the following:    Wheezing.    Prolonged cough.    Coughing up blood.    A change in your usual mucus.  · You have a stiff neck.  · You have changes in your:    Vision.    Hearing.    Thinking.    Mood.  MAKE SURE YOU:   · Understand these instructions.  · Will watch your condition.  · Will get help right away if you are not doing well or get  worse.     This information is not intended to replace advice given to you by your health care provider. Make sure you discuss any questions you have with your health care provider.     Document Released: 06/13/2002 Document Revised: 05/03/2016 Document Reviewed: 03/25/2015  [employer] Interactive Patient Education ©2017 [employer].  Azithromycin tablets  What is this medicine?  AZITHROMYCIN (az ith jovannialdo AYALA sin) is a macrolide antibiotic. It is used to treat or prevent certain kinds of bacterial infections. It will not work for colds, flu, or other viral infections.  This medicine may be used for other purposes; ask your health care provider or pharmacist if you have questions.  COMMON BRAND NAME(S): Zithromax, Zithromax Tri-Mike, Zithromax Z-Mike  What should I tell my health care provider before I take this medicine?  They need to know if you have any of these conditions:  -kidney disease  -liver disease  -irregular heartbeat or heart disease  -an unusual or allergic reaction to azithromycin, erythromycin, other macrolide antibiotics, foods, dyes, or preservatives  -pregnant or trying to get pregnant  -breast-feeding  How should I use this medicine?  Take this medicine by mouth with a full glass of water. Follow the directions on the prescription label. The tablets can be taken with food or on an empty stomach. If the medicine upsets your stomach, take it with food. Take your medicine at regular intervals. Do not take your medicine more often than directed. Take all of your medicine as directed even if you think your are better. Do not skip doses or stop your medicine early.  Talk to your pediatrician regarding the use of this medicine in children. While this drug may be prescribed for children as young as 6 months for selected conditions, precautions do apply.  Overdosage: If you think you have taken too much of this medicine contact a poison control center or emergency room at once.  NOTE: This medicine is only  for you. Do not share this medicine with others.  What if I miss a dose?  If you miss a dose, take it as soon as you can. If it is almost time for your next dose, take only that dose. Do not take double or extra doses.  What may interact with this medicine?  Do not take this medicine with any of the following medications:  -lincomycin  This medicine may also interact with the following medications:  -amiodarone  -antacids  -birth control pills  -cyclosporine  -digoxin  -magnesium  -nelfinavir  -phenytoin  -warfarin  This list may not describe all possible interactions. Give your health care provider a list of all the medicines, herbs, non-prescription drugs, or dietary supplements you use. Also tell them if you smoke, drink alcohol, or use illegal drugs. Some items may interact with your medicine.  What should I watch for while using this medicine?  Tell your doctor or healthcare professional if your symptoms do not start to get better or if they get worse.  Do not treat diarrhea with over the counter products. Contact your doctor if you have diarrhea that lasts more than 2 days or if it is severe and watery.  This medicine can make you more sensitive to the sun. Keep out of the sun. If you cannot avoid being in the sun, wear protective clothing and use sunscreen. Do not use sun lamps or tanning beds/booths.  What side effects may I notice from receiving this medicine?  Side effects that you should report to your doctor or health care professional as soon as possible:  -allergic reactions like skin rash, itching or hives, swelling of the face, lips, or tongue  -confusion, nightmares or hallucinations  -dark urine  -difficulty breathing  -hearing loss  -irregular heartbeat or chest pain  -pain or difficulty passing urine  -redness, blistering, peeling or loosening of the skin, including inside the mouth  -white patches or sores in the mouth  -yellowing of the eyes or skin  Side effects that usually do not require  medical attention (report to your doctor or health care professional if they continue or are bothersome):  -diarrhea  -dizziness, drowsiness  -headache  -stomach upset or vomiting  -tooth discoloration  -vaginal irritation  This list may not describe all possible side effects. Call your doctor for medical advice about side effects. You may report side effects to FDA at 2-061-FDA-7275.  Where should I keep my medicine?  Keep out of the reach of children.  Store at room temperature between 15 and 30 degrees C (59 and 86 degrees F). Throw away any unused medicine after the expiration date.  NOTE: This sheet is a summary. It may not cover all possible information. If you have questions about this medicine, talk to your doctor, pharmacist, or health care provider.     © 2017, [employer]. (2017-02-14 15:26:03)  Diclofenac skin gel  What is this medicine?  DICLOFENAC (dye KLOE fen ak) is a non-steroidal anti-inflammatory drug (NSAID). The 1% skin gel is used to treat osteoarthritis of the hands or knees. The 3% skin gel is used to treat actinic keratosis.  This medicine may be used for other purposes; ask your health care provider or pharmacist if you have questions.  COMMON BRAND NAME(S): DSG Mike, Solaraze, Voltaren Gel  What should I tell my health care provider before I take this medicine?  They need to know if you have any of these conditions:  -asthma  -bleeding problems  -coronary artery bypass graft (CABG) surgery within the past 2 weeks  -heart disease  -high blood pressure  -if you frequently drink alcohol containing drinks  -kidney disease  -liver disease  -open or infected skin  -stomach problems  -an unusual or allergic reaction to diclofenac, aspirin, other NSAIDs, other medicines, benzyl alcohol (3% gel only), foods, dyes, or preservatives  -pregnant or trying to get pregnant  -breast-feeding  How should I use this medicine?  This medicine is for external use only. Follow the directions on the  prescription label. Wash hands before and after use. Do not get this medicine in your eyes. If you do, rinse out with plenty of cool tap water. Use your doses at regular intervals. Do not use your medicine more often than directed.  A special MedGuide will be given to you by the pharmacist with each prescription and refill of the 1% gel. Be sure to read this information carefully each time.  Talk to your pediatrician regarding the use of this medicine in children. Special care may be needed. The 3% gel is not approved for use in children.  Overdosage: If you think you have taken too much of this medicine contact a poison control center or emergency room at once.  NOTE: This medicine is only for you. Do not share this medicine with others.  What if I miss a dose?  If you miss a dose, use it as soon as you can. If it is almost time for your next dose, use only that dose. Do not use double or extra doses.  What may interact with this medicine?  -aspirin  -NSAIDs, medicines for pain and inflammation, like ibuprofen or naproxen  Do not use any other skin products without telling your doctor or health care professional.  This list may not describe all possible interactions. Give your health care provider a list of all the medicines, herbs, non-prescription drugs, or dietary supplements you use. Also tell them if you smoke, drink alcohol, or use illegal drugs. Some items may interact with your medicine.  What should I watch for while using this medicine?  Tell your doctor or healthcare professional if your symptoms do not start to get better or if they get worse. You will need to follow up with your health care provider to monitor your progress. You may need to be treated for up to 3 months if you are using the 3% gel, but the full effect may not occur until 1 month after stopping treatment. If you develop a severe skin reaction, contact your doctor or health care professional immediately.  This medicine can make you more  sensitive to the sun. Keep out of the sun. If you cannot avoid being in the sun, wear protective clothing and use sunscreen. Do not use sun lamps or tanning beds/booths.  Do not take medicines such as ibuprofen and naproxen with this medicine. Side effects such as stomach upset, nausea, or ulcers may be more likely to occur. Many medicines available without a prescription should not be taken with this medicine.  This medicine does not prevent heart attack or stroke. In fact, this medicine may increase the chance of a heart attack or stroke. The chance may increase with longer use of this medicine and in people who have heart disease. If you take aspirin to prevent heart attack or stroke, talk with your doctor or health care professional.  This medicine can cause ulcers and bleeding in the stomach and intestines at any time during treatment. Do not smoke cigarettes or drink alcohol. These increase irritation to your stomach and can make it more susceptible to damage from this medicine. Ulcers and bleeding can happen without warning symptoms and can cause death.  You may get drowsy or dizzy. Do not drive, use machinery, or do anything that needs mental alertness until you know how this medicine affects you. Do not stand or sit up quickly, especially if you are an older patient. This reduces the risk of dizzy or fainting spells.  This medicine can cause you to bleed more easily. Try to avoid damage to your teeth and gums when you brush or floss your teeth.  What side effects may I notice from receiving this medicine?  Side effects that you should report to your doctor or health care professional as soon as possible:  -allergic reactions like skin rash, itching or hives, swelling of the face, lips, or tongue  -black or bloody stools, blood in the urine or vomit  -blurred vision  -chest pain  -difficulty breathing or wheezing  -nausea or vomiting  -redness, blistering, peeling or loosening of the skin, including inside  the mouth  -slurred speech or weakness on one side of the body  -trouble passing urine or change in the amount of urine  -unexplained weight gain or swelling  -unusually weak or tired  -yellowing of eyes or skin  Side effects that usually do not require medical attention (report to your doctor or health care professional if they continue or are bothersome):  -dizziness  -dry skin  -headache  -heartburn  -increased sensitivity to the sun  -stomach pain  -tingling at the application site  This list may not describe all possible side effects. Call your doctor for medical advice about side effects. You may report side effects to FDA at 0-975-UDZ-4547.  Where should I keep my medicine?  Keep out of the reach of children.  Store the 1% gel at room temperature between 15 and 30 degrees C (59 and 86 degrees F). Store the 3% gel at room temperature between 20 and 25 degrees C (68 and 77 degrees F). Protect from light. Throw away any unused medicine after the expiration date.  NOTE: This sheet is a summary. It may not cover all possible information. If you have questions about this medicine, talk to your doctor, pharmacist, or health care provider.     © 2017, Elsevier/Gold Standard. (2009-04-20 16:35:07)  Chronic Diarrhea  Diarrhea is frequent loose and watery bowel movements. It can cause you to feel weak and dehydrated. Dehydration can cause you to become tired and thirsty and to have a dry mouth, decreased urination, and dark yellow urine. Diarrhea is a sign of another problem, most often an infection that will not last long. In most cases, diarrhea lasts 2-3 days. Diarrhea that lasts longer than 4 weeks is called long-lasting (chronic) diarrhea. It is important to treat your diarrhea as directed by your health care provider to lessen or prevent future episodes of diarrhea.   CAUSES   There are many causes of chronic diarrhea. The following are some possible causes:   · Gastrointestinal infections caused by viruses,  bacteria, or parasites.    · Food poisoning or food allergies.    · Certain medicines, such as antibiotics, chemotherapy, and laxatives.    · Artificial sweeteners and fructose.    · Digestive disorders, such as celiac disease and inflammatory bowel diseases.    · Irritable bowel syndrome.  · Some disorders of the pancreas.  · Disorders of the thyroid.  · Reduced blood flow to the intestines.  · Cancer.  Sometimes the cause of chronic diarrhea is unknown.  RISK FACTORS  · Having a severely weakened immune system, such as from HIV or AIDS.    · Taking certain types of cancer-fighting drugs (such as with chemotherapy) or other medicines.    · Having had a recent organ transplant.    · Having a portion of the stomach or small bowel removed.    · Traveling to countries where food and water supplies are often contaminated.    SYMPTOMS   In addition to frequent, loose stools, diarrhea may cause:   · Cramping.    · Abdominal pain.    · Nausea.    · Fever.  · Fatigue.  · Urgent need to use the bathroom.  · Loss of bowel control.  DIAGNOSIS   Your health care provider must take a careful history and perform a physical exam. Tests given are based on your symptoms and history. Tests may include:   · Blood or stool tests. Three or more stool samples may be examined. Stool cultures may be used to test for bacteria or parasites.    · X-rays.    · A procedure in which a thin tube is inserted into the mouth or rectum (endoscopy). This allows the health care provider to look inside the intestine.    TREATMENT   · Treatment is aimed at correcting the cause of the diarrhea when possible.  · Diarrhea caused by an infection can often be treated with antibiotic medicines.  · Diarrhea not caused by an infection may require you to take long-term medicine or have surgery. Specific treatment should be discussed with your health care provider.  · If the cause cannot be determined, treatment aims to relieve symptoms and prevent dehydration.  Serious health problems can occur if you do not maintain proper fluid levels. Treatment may include:    Taking an oral rehydration solution (ORS).    Not drinking beverages that contain caffeine (such as tea, coffee, and soft drinks).    Not drinking alcohol.    Maintaining well-balanced nutrition to help you recover faster.  HOME CARE INSTRUCTIONS   · Drink enough fluids to keep urine clear or pale yellow. Drink 1 cup (8 oz) of fluid for each diarrhea episode. Avoid fluids that contain simple sugars, fruit juices, whole milk products, and sodas. Hydrate with an ORS. You may purchase the ORS or prepare it at home by mixing the following ingredients together:    ?-? tsp (1.7-3 ? mL) table salt.    ¾ tsp (3 ¾ mL) baking soda.    ? tsp (1.7 mL) salt substitute containing potassium chloride.    1 ? tbsp (20 mL) sugar.    4.2 c (1 L) of water.    · Certain foods and beverages may increase the speed at which food moves through the gastrointestinal (GI) tract. These foods and beverages should be avoided. They include:    Caffeinated and alcoholic beverages.    High-fiber foods, such as raw fruits and vegetables, nuts, seeds, and whole grain breads and cereals.    Foods and beverages sweetened with sugar alcohols, such as xylitol, sorbitol, and mannitol.    · Some foods may be well tolerated and may help thicken stool. These include:    Starchy foods, such as rice, toast, pasta, low-sugar cereal, oatmeal, grits, baked potatoes, crackers, and bagels.    Bananas.    Applesauce.  · Add probiotic-rich foods to help increase healthy bacteria in the GI tract. These include yogurt and fermented milk products.  · Wash your hands well after each diarrhea episode.  · Only take over-the-counter or prescription medicines as directed by your health care provider.  · Take a warm bath to relieve any burning or pain from frequent diarrhea episodes.  SEEK MEDICAL CARE IF:   · You are not urinating as often.  · Your urine is a dark  color.  · You become very tired or dizzy.  · You have severe pain in the abdomen or rectum.  · Your have blood or pus in your stools.  · Your stools look black and tarry.  SEEK IMMEDIATE MEDICAL CARE IF:   · You are unable to keep fluids down.  · You have persistent vomiting.  · You have blood in your stool.  · Your stools are black and tarry.  · You do not urinate in 6-8 hours, or there is only a small amount of very dark urine.  · You have abdominal pain that increases or localizes.  · You have weakness, dizziness, confusion, or lightheadedness.  · You have a severe headache.  · Your diarrhea gets worse or does not get better.  · You have a fever or persistent symptoms for more than 2-3 days.  · You have a fever and your symptoms suddenly get worse.  MAKE SURE YOU:   · Understand these instructions.  · Will watch your condition.  · Will get help right away if you are not doing well or get worse.     This information is not intended to replace advice given to you by your health care provider. Make sure you discuss any questions you have with your health care provider.     Document Released: 03/09/2005 Document Revised: 12/23/2014 Document Reviewed: 06/12/2014  [employer] Interactive Patient Education ©2017 [employer]

## 2017-09-14 NOTE — PROGRESS NOTES
Subjective   Jose Conway is a 31 y.o. male.   Mr. Conway presents today for 1 week f/u for anxiety and depression-started medications. The patient states that his anxiety symptoms have improved but still feeling depressed. Mr. Conway also wishes to discuss right shoulder pain that he has been having for some time as well as frequent insomnia and frequent diarrhea.    Anxiety   Presents for follow-up visit. Symptoms include depressed mood, excessive worry, insomnia, muscle tension and nervous/anxious behavior. Patient reports no chest pain, compulsions, confusion, decreased concentration, dizziness, dry mouth, feeling of choking, irritability, nausea, obsessions, palpitations, panic, restlessness, shortness of breath or suicidal ideas. Symptoms occur most days (reports symptom improvement since starting mediction). The severity of symptoms is moderate. The quality of sleep is poor.     His past medical history is significant for depression. Compliance with medications is %. Treatment side effects: none.   Depression   Visit Type: follow-up  Patient presents with the following symptoms: depressed mood, excessive worry, fatigue, insomnia, muscle tension and nervousness/anxiety.  Patient is not experiencing: anhedonia, chest pain, choking sensation, compulsions, confusion, decreased concentration, dry mouth, feelings of hopelessness, feelings of worthlessness, irritability, obsessions, palpitations, panic, restlessness, shortness of breath and suicidal ideas.  Frequency of symptoms: most days   Severity: moderate   Treatment side effects: none.      The following portions of the patient's history were reviewed and updated as appropriate: allergies, current medications, past family history, past medical history, past social history, past surgical history and problem list.    Review of Systems   Constitutional: Positive for fatigue. Negative for chills, diaphoresis, fever and irritability.   HENT: Positive for  congestion, postnasal drip and sinus pressure. Negative for ear discharge, ear pain, facial swelling, sore throat, trouble swallowing and voice change.    Respiratory: Negative for cough, choking, chest tightness, shortness of breath, wheezing and stridor.    Cardiovascular: Negative for chest pain, palpitations and leg swelling.   Gastrointestinal: Positive for diarrhea (5 or more watery stools x several weeks). Negative for abdominal distention, abdominal pain, blood in stool and nausea.   Genitourinary: Negative for dysuria.   Musculoskeletal:        Right shoulder pain x several months   Allergic/Immunologic: Positive for environmental allergies.   Neurological: Negative for dizziness.   Hematological: Negative.    Psychiatric/Behavioral: Positive for dysphoric mood. Negative for confusion, decreased concentration and suicidal ideas. The patient is nervous/anxious and has insomnia.        Objective   Physical Exam   Constitutional: He is oriented to person, place, and time. Vital signs are normal. He appears well-developed and well-nourished. He is cooperative. No distress.   HENT:   Head: Normocephalic and atraumatic.   Right Ear: External ear normal. Tympanic membrane is injected and bulging. A middle ear effusion is present.   Left Ear: External ear normal. Tympanic membrane is injected and bulging. A middle ear effusion is present.   Nose: Mucosal edema present. Right sinus exhibits no maxillary sinus tenderness and no frontal sinus tenderness. Left sinus exhibits no maxillary sinus tenderness and no frontal sinus tenderness.   Mouth/Throat: Uvula is midline and mucous membranes are normal. Posterior oropharyngeal erythema (mild with cobblestoning) present.   Neck: Normal range of motion. Neck supple.   Cardiovascular: Normal heart sounds.    Pulmonary/Chest: Effort normal and breath sounds normal.   Musculoskeletal: Normal range of motion.   Lymphadenopathy:     He has no cervical adenopathy.    Neurological: He is alert and oriented to person, place, and time.   Skin: Skin is warm and dry. No rash noted. He is not diaphoretic.   Psychiatric: He has a normal mood and affect. His speech is normal and behavior is normal. Judgment and thought content normal. He is not actively hallucinating. Cognition and memory are normal. He is attentive.   Vitals reviewed.      Assessment/Plan   Jose was seen today for follow-up.    Diagnoses and all orders for this visit:    Anxiety  -     busPIRone (BUSPAR) 5 MG tablet; Take 1 tablet by mouth 3 (Three) Times a Day.  -     sertraline (ZOLOFT) 50 MG tablet; Take 2 tablets by mouth Daily.    Depression, unspecified depression type  -     sertraline (ZOLOFT) 50 MG tablet; Take 2 tablets by mouth Daily.    Upper respiratory tract infection, unspecified type  -     azithromycin (ZITHROMAX) 250 MG tablet; Take 2 tablets the first day, then 1 tablet daily for 4 days.    Right shoulder pain, unspecified chronicity  -     Ambulatory Referral to Physical Therapy Evaluate and treat  -     diclofenac (VOLTAREN) 1 % gel gel; Apply 4 g topically 3 (Three) Times a Day.    Diarrhea, unspecified type  -     Ambulatory Referral to Gastroenterology    Insomnia, unspecified type  -     Ambulatory Referral to Sleep Medicine    Snoring  -     Ambulatory Referral to Sleep Medicine     Discussed the nature of the medical conditions including, risks, complications, implications, management, safe and proper use of medications. Encouraged medication compliance, and keeping scheduled follow up appointments with me and any other providers.

## 2017-09-16 ENCOUNTER — HOSPITAL ENCOUNTER (OUTPATIENT)
Dept: CT IMAGING | Facility: HOSPITAL | Age: 32
Discharge: HOME | End: 2017-09-16
Admitting: NURSE PRACTITIONER

## 2017-09-16 DIAGNOSIS — R91.1 PULMONARY NODULE: ICD-10-CM

## 2017-09-16 PROCEDURE — 71250 CT THORAX DX C-: CPT

## 2017-09-18 DIAGNOSIS — R91.1 INCIDENTAL PULMONARY NODULE, > 3MM AND < 8MM: Primary | ICD-10-CM

## 2017-10-02 ENCOUNTER — RESULTS ENCOUNTER (OUTPATIENT)
Dept: GASTROENTEROLOGY | Facility: CLINIC | Age: 32
End: 2017-10-02

## 2017-10-02 ENCOUNTER — OFFICE VISIT (OUTPATIENT)
Dept: GASTROENTEROLOGY | Facility: CLINIC | Age: 32
End: 2017-10-02

## 2017-10-02 VITALS
WEIGHT: 233.6 LBS | OXYGEN SATURATION: 96 % | HEIGHT: 70 IN | SYSTOLIC BLOOD PRESSURE: 120 MMHG | TEMPERATURE: 97.9 F | BODY MASS INDEX: 33.44 KG/M2 | DIASTOLIC BLOOD PRESSURE: 68 MMHG | HEART RATE: 72 BPM

## 2017-10-02 DIAGNOSIS — K52.9 CHRONIC DIARRHEA: ICD-10-CM

## 2017-10-02 DIAGNOSIS — R63.4 UNINTENTIONAL WEIGHT LOSS: ICD-10-CM

## 2017-10-02 DIAGNOSIS — Z90.49 HISTORY OF CHOLECYSTECTOMY: ICD-10-CM

## 2017-10-02 DIAGNOSIS — K52.9 CHRONIC DIARRHEA: Primary | ICD-10-CM

## 2017-10-02 DIAGNOSIS — R12 HEARTBURN: ICD-10-CM

## 2017-10-02 DIAGNOSIS — Z72.0 CURRENT TOBACCO USE: ICD-10-CM

## 2017-10-02 PROCEDURE — 99214 OFFICE O/P EST MOD 30 MIN: CPT | Performed by: NURSE PRACTITIONER

## 2017-10-02 RX ORDER — ESOMEPRAZOLE MAGNESIUM 40 MG/1
40 CAPSULE, DELAYED RELEASE ORAL EVERY MORNING
Qty: 30 CAPSULE | Refills: 2 | Status: SHIPPED | OUTPATIENT
Start: 2017-10-02 | End: 2018-08-31 | Stop reason: SDUPTHER

## 2017-10-02 RX ORDER — CHOLESTYRAMINE 4 G/9G
1 POWDER, FOR SUSPENSION ORAL
Qty: 90 PACKET | Refills: 2 | Status: SHIPPED | OUTPATIENT
Start: 2017-10-02 | End: 2017-12-20

## 2017-10-02 RX ORDER — LOPERAMIDE HYDROCHLORIDE 2 MG/1
2 CAPSULE ORAL AS NEEDED
COMMUNITY

## 2017-10-02 NOTE — PROGRESS NOTES
OUTPATIENT NEW PATIENT NOTE  Patient: JOSE CONWAY : 1985  Date of Service: 10/02/2017  Dear [place] Shaikh   Thank you for your referral of this patient for evaluation of diarrhea  CC: Diarrhea  Assessment/Plan                                             ASSESSMENT & PLANS     Chronic diarrhea  History of cholecystectomy  -     BRANDI; Future  -     Bile Acids, Total; Future  -     Celiac Disease Panel; Future  -     Allergen Food Panel; Future  -     Fecal Lactoferrin - Stool, Per Rectum; Future  -     Pancreatic Elastase, Fecal - Stool, Per Rectum; Future  -     Clostridium Difficile Toxin, PCR - Stool, Per Rectum; Future  -     cholestyramine (QUESTRAN) 4 g packet; Take 1 packet by mouth 3 (Three) Times a Day With Meals.    Heartburn  -     Esophagogastroduodenoscopy  -     H. Pylori Breath Test - , Lung; Future  -     Start esomeprazole (nexIUM) 40 MG capsule; Take 1 capsule by mouth Every Morning for 1 day. Take first thing in the morning on an empty stomach.  Wait 30 min to 1hr before eating.    Unintentional weight loss Lost about 10 lbs unintentionally in about 2 months    Current tobacco use        DISCUSSION: The above plan was delineated in details with patient and all questions and concerns were answered.  Patient is also given contact information.  Patient is to return as scheduled or sooner if new problems arise  Subjective                                                     SUBJECTIVE   History of Present Illness  Mr. Jose Conway is a 31 y.o. male presents to the clinic today for an evaluation of Diarrhea  Pt reports of having diarrhea for yrs, since ccy 2011 (d/t gallstones)  Diarrhea never went away after ccy  5-10 x a day.  Has to plan his activities around his BM.  Still able to have formed stools some.   Stools soft/pasty mostly.  Sometimes mucus and oily.  Yellow.   Diarrhea is mostly daytime.   Diarrhea worsened w/ fried food and increased stress.   +++ Also  "lower/suprapubic abd pain.  Intermittent pain.    +++Heartburn, decreased appetite, occasional vomiting after having coughing spells.  Chronic smoker. Has a lung nodule.  Seeing a pulmonologist. Takes Tums like \"candy.\" go through a bottle in about 1 to 1.5 wk  +++Lost about 10 lbs unintentionally in about 2 months. Used to wt 242.  Today pt is at 233 lbs. Recent night sweat, but resolves w/ taking Buspar and Zoloft      Zpack  About 2-3 round of abx a yr.  Takes Aleve PRN a few times a wk.    Pt denies dark black stools or bright red blood in the stools, in the toilet bowl, or on the toilet tissue. Pt denies dysphagia, odynophagia, reflux, regurgitation, early satiety.  Pt has good appetite.   Pt denies consuming any suspected food or unpurified water.  No one w/ whom pt has shared a meal has developed diarrhea.  Pt has not taken been hospitalized in the months leading up to diarrheal illness. No recent travel outside of the [place]. Pt has no family hx of inflammatory bowel disease.    Has tried probiotic for about 2.5 wks w/o diarrhea sx improvement.  Takes imodium w/ diarrhea sx relief, but he seldom takes it     ROS:Review of Systems   Constitutional: Positive for appetite change (poor) and fatigue. Negative for activity change, fever and unexpected weight change.   HENT: Negative for hearing loss, trouble swallowing and voice change.    Eyes: Negative for visual disturbance.   Respiratory: Positive for cough, choking, chest tightness and shortness of breath.    Cardiovascular: Negative for chest pain.   Gastrointestinal: Positive for abdominal pain, diarrhea, nausea and vomiting. Negative for abdominal distention, anal bleeding, blood in stool, constipation and rectal pain.        Heartburn   Endocrine: Negative for polydipsia and polyphagia.   Genitourinary: Negative.    Musculoskeletal: Negative for gait problem and joint swelling.   Skin: Negative for color change and rash.   Allergic/Immunologic: " Positive for food allergies (coconut).   Neurological: Positive for dizziness and weakness. Negative for seizures and speech difficulty.   Hematological: Negative for adenopathy.   Psychiatric/Behavioral: Negative for confusion.     Subjective   PAST MED HX: Pt  has a past medical history of Allergic; Asthma; Bipolar disease, manic; and Depression.  PAST SURG HX: Pt  has a past surgical history that includes Tonsillectomy; Adenoidectomy; Cholecystectomy; Pyloroplasty 1987 for pyloric stenosis as a child; and [place] tooth extraction.  FAM HX: family history includes Anxiety disorder in his mother; Cancer in his father; Depression in his mother; Diabetes in his father; Diverticulitis in his father and mother; Drug abuse in his mother; Hyperlipidemia in his father and mother; Irritable bowel syndrome in his paternal grandfather; Pancreatitis in his mother.  SOC HX: Pt  reports that he has been smoking Cigarettes with 0.5 ppd.  He has a 16.00 pack-year smoking history. He has never used smokeless tobacco. He reports that he drinks alcohol. He reports that he does not use illicit drugs.  Objective                                                           OBJECTIVE   Allergy: Pt is allergic to penicillins and other.  MEDS:   busPIRone (BUSPAR) 5 MG tablet, Take 1 tablet by mouth 3 (Three) Times a Day., Disp: 90 tablet, Rfl: 0  •  diclofenac (VOLTAREN) 1 % gel gel, Apply 4 g topically 3 (Three) Times a Day., Disp: 100 g, Rfl: 0  •  loperamide (IMODIUM) 2 MG capsule, Take 2 mg by mouth As Needed for Diarrhea., Disp: , Rfl:   •  sertraline (ZOLOFT) 50 MG tablet, Take 2 tablets by mouth Daily., Disp: 60 tablet, Rfl: 0  Lab Results   Component Value Date    WBC 9.93 09/07/2017    HGB 17.2 09/07/2017    HCT 48.1 09/07/2017    MCV 91.1 09/07/2017    MCHC 35.8 09/07/2017     (L) 09/07/2017     Lab Results   Component Value Date     09/07/2017    K 4.1 09/07/2017     09/07/2017    CO2 28.0 09/07/2017    BUN 13  09/07/2017    CREATININE 0.80 09/07/2017    GLUCOSE 85 09/07/2017    CALCIUM 9.6 09/07/2017    ANIONGAP 5.0 09/07/2017     Lab Results   Component Value Date    AST 19 09/07/2017    ALT 40 09/07/2017    ALKPHOS 73 09/07/2017    BILITOT 0.3 09/07/2017    PROTEINTOT 7.3 09/07/2017    ALBUMIN 4.70 09/07/2017        Lab Results   Component Value Date    HEPAIGM Non-Reactive 09/07/2017    HEPBSAG Non-Reactive 09/07/2017    HEPBIGMCORE Non-Reactive 09/07/2017    HEPCAB Non-Reactive 09/07/2017     Wt Readings from Last 5 Encounters:   10/02/17 233 lb 9.6 oz (106 kg)   09/14/17 226 lb (103 kg)   09/07/17 226 lb (103 kg)   body mass index is 33.52 kg/(m^2).,Temp: 97.9 °F (36.6 °C),BP: 120/68,Heart Rate: 72   Physical Exam  General Well developed; well nourished; no acute distress.   ENT Oral mucosa pink and moist without thrush or lesions.    Neck Neck supple; trachea midline. No thyromegaly   Resp CTA; no rhonchi, rales, or wheezes.  Respiration effort normal  CV RRR; normal S1, S2; no M/R/G. No lower extremity edema  GI Abd soft, NT, ND, normal active bowel sounds.  No HSM.  No abd hernia  Skin No rash; no lesions; no bruises.  Skin turgor normal  Musc No clubbing; no cyanosis.  Gait steady  Psych Oriented to time, place, and person.  Appropriate affect  Thank you kindly for allowing me to be part of this patient’s care.    Pt care team: Elizabeth IBRAHIM & Jodie Lopez, [facility]--Gastroenterology  [phone]    CC: Dr.Julia Ramos, [place]  [address] / [facility] [address] FAX:[phone]

## 2017-10-15 ENCOUNTER — TELEPHONE (OUTPATIENT)
Dept: FAMILY MEDICINE CLINIC | Facility: CLINIC | Age: 32
End: 2017-10-15

## 2017-10-24 DIAGNOSIS — F32.A DEPRESSION, UNSPECIFIED DEPRESSION TYPE: ICD-10-CM

## 2017-10-24 DIAGNOSIS — F41.9 ANXIETY: ICD-10-CM

## 2017-11-09 ENCOUNTER — TELEPHONE (OUTPATIENT)
Dept: GASTROENTEROLOGY | Facility: CLINIC | Age: 32
End: 2017-11-09

## 2017-11-09 NOTE — TELEPHONE ENCOUNTER
Called patient in regards to overdue stool & breath test that hasn't been done. No answer; left voice message.

## 2017-11-13 ENCOUNTER — OFFICE VISIT (OUTPATIENT)
Dept: PULMONOLOGY | Facility: CLINIC | Age: 32
End: 2017-11-13

## 2017-11-13 ENCOUNTER — RESULTS ENCOUNTER (OUTPATIENT)
Dept: PULMONOLOGY | Facility: CLINIC | Age: 32
End: 2017-11-13

## 2017-11-13 VITALS
HEART RATE: 68 BPM | RESPIRATION RATE: 16 BRPM | OXYGEN SATURATION: 98 % | TEMPERATURE: 98 F | DIASTOLIC BLOOD PRESSURE: 78 MMHG | SYSTOLIC BLOOD PRESSURE: 118 MMHG | HEIGHT: 70 IN | BODY MASS INDEX: 32.5 KG/M2 | WEIGHT: 227 LBS

## 2017-11-13 DIAGNOSIS — R19.7 DIARRHEA, UNSPECIFIED TYPE: ICD-10-CM

## 2017-11-13 DIAGNOSIS — R06.02 SOB (SHORTNESS OF BREATH): Primary | ICD-10-CM

## 2017-11-13 DIAGNOSIS — J45.40 MODERATE PERSISTENT ASTHMA WITHOUT COMPLICATION: ICD-10-CM

## 2017-11-13 DIAGNOSIS — R91.1 PULMONARY NODULE: ICD-10-CM

## 2017-11-13 DIAGNOSIS — R91.1 LUNG NODULE: Primary | ICD-10-CM

## 2017-11-13 PROCEDURE — 99204 OFFICE O/P NEW MOD 45 MIN: CPT | Performed by: INTERNAL MEDICINE

## 2017-11-13 PROCEDURE — 94726 PLETHYSMOGRAPHY LUNG VOLUMES: CPT | Performed by: INTERNAL MEDICINE

## 2017-11-13 PROCEDURE — 94729 DIFFUSING CAPACITY: CPT | Performed by: INTERNAL MEDICINE

## 2017-11-13 PROCEDURE — 94375 RESPIRATORY FLOW VOLUME LOOP: CPT | Performed by: INTERNAL MEDICINE

## 2017-11-13 RX ORDER — ALBUTEROL SULFATE 90 UG/1
2 AEROSOL, METERED RESPIRATORY (INHALATION) EVERY 4 HOURS PRN
Qty: 6.7 G | Refills: 11 | Status: SHIPPED | OUTPATIENT
Start: 2017-11-13 | End: 2017-12-20 | Stop reason: SDUPTHER

## 2017-11-13 RX ORDER — LORATADINE 10 MG/1
CAPSULE, LIQUID FILLED ORAL DAILY
COMMUNITY
End: 2017-12-20 | Stop reason: SDUPTHER

## 2017-11-13 RX ORDER — NAPROXEN SODIUM 220 MG
220 TABLET ORAL AS NEEDED
COMMUNITY
End: 2018-09-12

## 2017-11-13 NOTE — PROGRESS NOTES
"Initial Pulmonary Consult Note    Subjective   Reason for consultation/Chief Complaint: Pulmonary Nodule    Jose Conway is a 32 y.o. male is being seen for consultation today at the request of ALEXANDRIA Gaston    History of Present Illness  Mr. Conway is a 31yo M who is referred to clinic for a pulmonary nodule. He reports that he had a CT scan of the chest performed secondary to shortness of breath and frequent episodes of bronchitis. A CT Chest performed on 9/16/17 showed a 7mm right upper lobe pulmonary nodule. He reports that he previously had a 5mm RUL nodule discovered on a CT scan of the chest in 2015 at [facility] in [facility]. He was supposed to follow up with a repeat CT scan of the chest in 6 months but did not secondary to insurance reasons.     He tells me that he has episodes of bronchitis 4-5x per year. This has become worse since childhood. He also reports a couple of episodes of pneumonia. He is a smoker and started smoking around age 15. He recently quit smoking tobacco and now using an electronic cigarette. He makes his own solution that does not contain nicotine.     He has a history of asthma but has not been using any inhalers recently. He notes that he has everyday coughing and wheezing that is worse with exertion. He has used an inhaler when he had bronchitis which seemed to help. He does not have any nighttime symptoms.     He also has a history of chronic diarrhea since approximately 2011. He had a colonoscopy in 2011 and underwent a cholecystectomy in 2011 but has continued to have diarrhea. He is scheduled for an upper endoscopy this week with GI. He also notes that he has \"hot flashes\" which he describes as flushing and burning of his skin.     He notes that he has an aunt and a great aunt with a history of lung cancer.     Active Ambulatory Problems     Diagnosis Date Noted   • No Active Ambulatory Problems     Resolved Ambulatory Problems     Diagnosis Date Noted   • No " Resolved Ambulatory Problems     Past Medical History:   Diagnosis Date   • Allergic    • Asthma    • Bipolar disease, manic    • Depression        Past Surgical History:   Procedure Laterality Date   • ADENOIDECTOMY     • CHOLECYSTECTOMY     • PYLOROPLASTY     • TONSILLECTOMY     • WISDOM TOOTH EXTRACTION         Family History   Problem Relation Age of Onset   • Pancreatitis Mother    • Diverticulitis Mother    • Depression Mother    • Anxiety disorder Mother    • Drug abuse Mother    • Hyperlipidemia Mother    • Cancer Father      leuk   • Diabetes Father    • Hyperlipidemia Father    • Diverticulitis Father    • Irritable bowel syndrome Paternal Grandfather        Social History     Social History   • Marital status: Single     Spouse name: N/A   • Number of children: N/A   • Years of education: N/A     Occupational History   • Not on file.     Social History Main Topics   • Smoking status: Current Some Day Smoker     Packs/day: 1.00     Years: 16.00     Types: Cigarettes   • Smokeless tobacco: Never Used   • Alcohol use Yes      Comment: Hasn't had a drink in two weeks   • Drug use: No   • Sexual activity: Yes     Partners: Male     Birth control/ protection: Condom     Other Topics Concern   • Not on file     Social History Narrative       Allergies   Allergen Reactions   • Penicillins Anaphylaxis and Rash   • Other Hives     coconuts       Current Outpatient Prescriptions   Medication Sig Dispense Refill   • busPIRone (BUSPAR) 5 MG tablet Take 1 tablet by mouth 3 (Three) Times a Day. 90 tablet 0   • cholestyramine (QUESTRAN) 4 g packet Take 1 packet by mouth 3 (Three) Times a Day With Meals. 90 packet 2   • diclofenac (VOLTAREN) 1 % gel gel Apply 4 g topically 3 (Three) Times a Day. 100 g 0   • loperamide (IMODIUM) 2 MG capsule Take 2 mg by mouth As Needed for Diarrhea.     • Loratadine (CLARITIN) 10 MG capsule Take  by mouth Daily.     • naproxen sodium (ALEVE) 220 MG tablet Take 220 mg by mouth As Needed.    "  • sertraline (ZOLOFT) 50 MG tablet Take 2 tablets by mouth Daily. 60 tablet 0   • albuterol (PROVENTIL HFA;VENTOLIN HFA) 108 (90 Base) MCG/ACT inhaler Inhale 2 puffs Every 4 (Four) Hours As Needed for Wheezing. 6.7 g 11   • mometasone (ASMANEX TWISTHALER) inhaler 220 mcg/inhalation Inhale 2 puffs Daily. 1 inhaler 11     No current facility-administered medications for this visit.        Review of Systems  All other systems were reviewed and are negative.  Exceptions are included in the HPI or as otherwise noted above.     Objective   Blood pressure 118/78, pulse 68, temperature 98 °F (36.7 °C), resp. rate 16, height 69.5\" (176.5 cm), weight 227 lb (103 kg), SpO2 98 %.  Physical Exam   Constitutional: He is oriented to person, place, and time. He appears well-developed and well-nourished.   HENT:   Head: Normocephalic and atraumatic.   Eyes: EOM are normal. Pupils are equal, round, and reactive to light.   Neck: Normal range of motion. Neck supple. No thyromegaly present.   Cardiovascular: Normal rate and regular rhythm.  Exam reveals no friction rub.    No murmur heard.  Pulmonary/Chest: Effort normal and breath sounds normal.   Abdominal: Soft. He exhibits no distension. There is no tenderness.   Musculoskeletal: Normal range of motion. He exhibits no edema.   Neurological: He is alert and oriented to person, place, and time.   Skin: Skin is warm and dry.   Psychiatric: He has a normal mood and affect. Judgment normal.       PFTs:  Performed in clinic and personally reviewed.   There is no airway obstruction.   There is no restriction.   DLCO is normal.       Imaging:  CT Chest from 9/16/17 personally reviewed. There is a noncalcified right upper lobe pulmonary nodule which measures 7mm. There is no mediastinal or hilar adenopathy.     Assessment/Plan   Jose was seen today for shortness of breath.    Diagnoses and all orders for this visit:    SOB (shortness of breath)  -     Pulmonary Function Test    Pulmonary " nodule  -     5 HIAA, Urine, Quantitative, Random - Urine, Clean Catch; Future  -     Serotonin Serum; Future  -     Chromogranin A; Future    Diarrhea, unspecified type  -     5 HIAA, Urine, Quantitative, Random - Urine, Clean Catch; Future  -     Serotonin Serum; Future  -     Chromogranin A; Future    Moderate persistent asthma without complication    Other orders  -     mometasone (ASMANEX TWISTHALER) inhaler 220 mcg/inhalation; Inhale 2 puffs Daily.  -     albuterol (PROVENTIL HFA;VENTOLIN HFA) 108 (90 Base) MCG/ACT inhaler; Inhale 2 puffs Every 4 (Four) Hours As Needed for Wheezing.        Discussion:  Mr. Conway is a 33yo M who is referred for further management of a solitary pulmonary nodule.     1. Right Upper Lobe Pulmonary Nodule  - He does have a history of smoking, but based on his age, he is low risk for a primary lung cancer.   - Plan to repeat CT chest 6 months from original scan for monitoring.   - We will attempt to obtain the records from his previous CT scan of the chest from 2015.   - Chronic diarrhea plus flushing and a pulmonary nodule does raise concern for carcinoid which can present with a pulmonary nodule. Will check serotonin assays.     2. Moderate Persistent Asthma  - Start Asmanex 220mcg daily.     3. Tobacco Abuse  - Still occasionally using traditional cigarettes.   - Counseled on the potential harm of e-cigarettes and that they are not FDA approved for smoking cessation.   - < 10 minutes spent on smoking cessation counseling.          I personally spent over half of a total 45 minutes face to face with the patient in counseling and discussion and/or coordination of care as described above.       Catherine DIAZ Case, DO  Pulmonary and Critical Care Medicine

## 2017-11-16 ENCOUNTER — LAB REQUISITION (OUTPATIENT)
Dept: LAB | Facility: HOSPITAL | Age: 32
End: 2017-11-16

## 2017-11-16 ENCOUNTER — OUTSIDE FACILITY SERVICE (OUTPATIENT)
Dept: GASTROENTEROLOGY | Facility: CLINIC | Age: 32
End: 2017-11-16

## 2017-11-16 DIAGNOSIS — K21.00 GASTRO-ESOPHAGEAL REFLUX DISEASE WITH ESOPHAGITIS: ICD-10-CM

## 2017-11-16 PROCEDURE — 88305 TISSUE EXAM BY PATHOLOGIST: CPT | Performed by: INTERNAL MEDICINE

## 2017-11-16 PROCEDURE — 43239 EGD BIOPSY SINGLE/MULTIPLE: CPT | Performed by: INTERNAL MEDICINE

## 2017-11-17 LAB
CYTO UR: NORMAL
LAB AP CASE REPORT: NORMAL
LAB AP CLINICAL INFORMATION: NORMAL
Lab: NORMAL
PATH REPORT.FINAL DX SPEC: NORMAL
PATH REPORT.GROSS SPEC: NORMAL

## 2017-11-18 ENCOUNTER — RESULTS ENCOUNTER (OUTPATIENT)
Dept: PULMONOLOGY | Facility: CLINIC | Age: 32
End: 2017-11-18

## 2017-11-18 DIAGNOSIS — F41.9 ANXIETY: ICD-10-CM

## 2017-11-18 DIAGNOSIS — F32.A DEPRESSION, UNSPECIFIED DEPRESSION TYPE: ICD-10-CM

## 2017-11-18 DIAGNOSIS — R91.1 PULMONARY NODULE: ICD-10-CM

## 2017-11-18 DIAGNOSIS — R19.7 DIARRHEA, UNSPECIFIED TYPE: ICD-10-CM

## 2017-11-18 RX ORDER — BUSPIRONE HYDROCHLORIDE 5 MG/1
TABLET ORAL
Qty: 90 TABLET | Refills: 0 | Status: CANCELLED | OUTPATIENT
Start: 2017-11-18

## 2017-11-20 DIAGNOSIS — F41.9 ANXIETY: ICD-10-CM

## 2017-11-20 DIAGNOSIS — F32.A DEPRESSION, UNSPECIFIED DEPRESSION TYPE: ICD-10-CM

## 2017-11-20 RX ORDER — BUSPIRONE HYDROCHLORIDE 5 MG/1
5 TABLET ORAL 3 TIMES DAILY
Qty: 90 TABLET | Refills: 0 | Status: SHIPPED | OUTPATIENT
Start: 2017-11-20 | End: 2017-12-20 | Stop reason: DRUGHIGH

## 2017-11-22 ENCOUNTER — TELEPHONE (OUTPATIENT)
Dept: GASTROENTEROLOGY | Facility: CLINIC | Age: 32
End: 2017-11-22
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
## 2017-12-20 ENCOUNTER — OFFICE VISIT (OUTPATIENT)
Dept: FAMILY MEDICINE CLINIC | Facility: CLINIC | Age: 32
End: 2017-12-20

## 2017-12-20 VITALS
RESPIRATION RATE: 16 BRPM | SYSTOLIC BLOOD PRESSURE: 120 MMHG | DIASTOLIC BLOOD PRESSURE: 68 MMHG | HEART RATE: 105 BPM | WEIGHT: 224.4 LBS | OXYGEN SATURATION: 98 % | BODY MASS INDEX: 33.24 KG/M2 | TEMPERATURE: 98.1 F | HEIGHT: 69 IN

## 2017-12-20 DIAGNOSIS — J45.30 MILD PERSISTENT ASTHMA, UNSPECIFIED WHETHER COMPLICATED: ICD-10-CM

## 2017-12-20 DIAGNOSIS — Z76.0 MEDICATION REFILL: ICD-10-CM

## 2017-12-20 DIAGNOSIS — K21.9 GASTROESOPHAGEAL REFLUX DISEASE, ESOPHAGITIS PRESENCE NOT SPECIFIED: ICD-10-CM

## 2017-12-20 DIAGNOSIS — J30.9 ALLERGIC RHINITIS, UNSPECIFIED CHRONICITY, UNSPECIFIED SEASONALITY, UNSPECIFIED TRIGGER: ICD-10-CM

## 2017-12-20 DIAGNOSIS — F32.A DEPRESSION, UNSPECIFIED DEPRESSION TYPE: ICD-10-CM

## 2017-12-20 DIAGNOSIS — F41.9 ANXIETY: Primary | ICD-10-CM

## 2017-12-20 DIAGNOSIS — S09.93XA FACIAL TRAUMA, INITIAL ENCOUNTER: ICD-10-CM

## 2017-12-20 PROCEDURE — 99214 OFFICE O/P EST MOD 30 MIN: CPT | Performed by: NURSE PRACTITIONER

## 2017-12-20 RX ORDER — ESOMEPRAZOLE MAGNESIUM 40 MG/1
40 CAPSULE, DELAYED RELEASE ORAL
Qty: 30 CAPSULE | Refills: 2 | Status: SHIPPED | OUTPATIENT
Start: 2017-12-20 | End: 2018-09-01

## 2017-12-20 RX ORDER — LORATADINE 10 MG/1
10 CAPSULE, LIQUID FILLED ORAL DAILY
Qty: 30 EACH | Refills: 2 | Status: SHIPPED | OUTPATIENT
Start: 2017-12-20 | End: 2017-12-23

## 2017-12-20 RX ORDER — BUSPIRONE HYDROCHLORIDE 15 MG/1
15 TABLET ORAL 2 TIMES DAILY
Qty: 60 TABLET | Refills: 1 | Status: SHIPPED | OUTPATIENT
Start: 2017-12-20 | End: 2018-03-23 | Stop reason: DRUGHIGH

## 2017-12-20 RX ORDER — ALBUTEROL SULFATE 90 UG/1
2 AEROSOL, METERED RESPIRATORY (INHALATION) EVERY 4 HOURS PRN
Qty: 6.7 G | Refills: 3 | Status: SHIPPED | OUTPATIENT
Start: 2017-12-20 | End: 2019-02-20 | Stop reason: SDUPTHER

## 2017-12-20 RX ORDER — ESOMEPRAZOLE MAGNESIUM 40 MG/1
CAPSULE, DELAYED RELEASE ORAL
Refills: 2 | COMMUNITY
Start: 2017-10-25 | End: 2017-12-20 | Stop reason: SDUPTHER
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
## 2017-12-20 NOTE — PATIENT INSTRUCTIONS
Major Depressive Disorder  Major depressive disorder is a mental illness. It also may be called clinical depression or unipolar depression. Major depressive disorder usually causes feelings of sadness, hopelessness, or helplessness. Some people with this disorder do not feel particularly sad but lose interest in doing things they used to enjoy (anhedonia). Major depressive disorder also can cause physical symptoms. It can interfere with work, school, relationships, and other normal everyday activities. The disorder varies in severity but is longer lasting and more serious than the sadness we all feel from time to time in our lives.  Major depressive disorder often is triggered by stressful life events or major life changes. Examples of these triggers include divorce, loss of your job or home, a move, and the death of a family member or close friend. Sometimes this disorder occurs for no obvious reason at all. People who have family members with major depressive disorder or bipolar disorder are at higher risk for developing this disorder, with or without life stressors. Major depressive disorder can occur at any age. It may occur just once in your life (single episode major depressive disorder). It may occur multiple times (recurrent major depressive disorder).  SYMPTOMS  People with major depressive disorder have either anhedonia or depressed mood on nearly a daily basis for at least 2 weeks or longer. Symptoms of depressed mood include:  · Feelings of sadness (blue or down in the dumps) or emptiness.  · Feelings of hopelessness or helplessness.  · Tearfulness or episodes of crying (may be observed by others).  · Irritability (children and adolescents).  In addition to depressed mood or anhedonia or both, people with this disorder have at least four of the following symptoms:  · Difficulty sleeping or sleeping too much.    · Significant change (increase or decrease) in appetite or weight.    · Lack of energy or  motivation.  · Feelings of guilt and worthlessness.    · Difficulty concentrating, remembering, or making decisions.  · Unusually slow movement (psychomotor retardation) or restlessness (as observed by others).    · Recurrent wishes for death, recurrent thoughts of self-harm (suicide), or a suicide attempt.  People with major depressive disorder commonly have persistent negative thoughts about themselves, other people, and the world. People with severe major depressive disorder may experience distorted beliefs or perceptions about the world (psychotic delusions). They also may see or hear things that are not real (psychotic hallucinations).  DIAGNOSIS  Major depressive disorder is diagnosed through an assessment by your health care provider. Your health care provider will ask about aspects of your daily life, such as mood, sleep, and appetite, to see if you have the diagnostic symptoms of major depressive disorder. Your health care provider may ask about your medical history and use of alcohol or drugs, including prescription medicines. Your health care provider also may do a physical exam and blood work. This is because certain medical conditions and the use of certain substances can cause major depressive disorder-like symptoms (secondary depression). Your health care provider also may refer you to a mental health specialist for further evaluation and treatment.  TREATMENT  It is important to recognize the symptoms of major depressive disorder and seek treatment. The following treatments can be prescribed for this disorder:    · Medicine. Antidepressant medicines usually are prescribed. Antidepressant medicines are thought to correct chemical imbalances in the brain that are commonly associated with major depressive disorder. Other types of medicine may be added if the symptoms do not respond to antidepressant medicines alone or if psychotic delusions or hallucinations occur.  · Talk therapy. Talk therapy can be  helpful in treating major depressive disorder by providing support, education, and guidance. Certain types of talk therapy also can help with negative thinking (cognitive behavioral therapy) and with relationship issues that trigger this disorder (interpersonal therapy).  A mental health specialist can help determine which treatment is best for you. Most people with major depressive disorder do well with a combination of medicine and talk therapy. Treatments involving electrical stimulation of the brain can be used in situations with extremely severe symptoms or when medicine and talk therapy do not work over time. These treatments include electroconvulsive therapy, transcranial magnetic stimulation, and vagal nerve stimulation.     This information is not intended to replace advice given to you by your health care provider. Make sure you discuss any questions you have with your health care provider.     Document Released: 04/14/2014 Document Revised: 01/08/2016 Document Reviewed: 04/14/2014  [employer] Interactive Patient Education ©2017 [employer]  Generalized Anxiety Disorder  Generalized anxiety disorder (ROSA) is a mental disorder. It interferes with life functions, including relationships, work, and school.  ROSA is different from normal anxiety, which everyone experiences at some point in their lives in response to specific life events and activities. Normal anxiety actually helps us prepare for and get through these life events and activities. Normal anxiety goes away after the event or activity is over.   ROSA causes anxiety that is not necessarily related to specific events or activities. It also causes excess anxiety in proportion to specific events or activities. The anxiety associated with ROSA is also difficult to control. ROSA can vary from mild to severe. People with severe ROSA can have intense waves of anxiety with physical symptoms (panic attacks).   SYMPTOMS  The anxiety and worry associated with ROSA  are difficult to control. This anxiety and worry are related to many life events and activities and also occur more days than not for 6 months or longer. People with ROSA also have three or more of the following symptoms (one or more in children):  · Restlessness.    · Fatigue.  · Difficulty concentrating.    · Irritability.  · Muscle tension.  · Difficulty sleeping or unsatisfying sleep.  DIAGNOSIS  ROSA is diagnosed through an assessment by your health care provider. Your health care provider will ask you questions about your mood, physical symptoms, and events in your life. Your health care provider may ask you about your medical history and use of alcohol or drugs, including prescription medicines. Your health care provider may also do a physical exam and blood tests. Certain medical conditions and the use of certain substances can cause symptoms similar to those associated with ROSA. Your health care provider may refer you to a mental health specialist for further evaluation.  TREATMENT  The following therapies are usually used to treat ROSA:   · Medication. Antidepressant medication usually is prescribed for long-term daily control. Antianxiety medicines may be added in severe cases, especially when panic attacks occur.    · Talk therapy (psychotherapy). Certain types of talk therapy can be helpful in treating ROSA by providing support, education, and guidance. A form of talk therapy called cognitive behavioral therapy can teach you healthy ways to think about and react to daily life events and activities.  · Stress management techniques. These include yoga, meditation, and exercise and can be very helpful when they are practiced regularly.  A mental health specialist can help determine which treatment is best for you. Some people see improvement with one therapy. However, other people require a combination of therapies.     This information is not intended to replace advice given to you by your health care  provider. Make sure you discuss any questions you have with your health care provider.     Document Released: 04/14/2014 Document Revised: 01/08/2016 Document Reviewed: 04/14/2014  [employer] Interactive Patient Education ©2017 [employer]    Head Injury, Adult  You have received a head injury. It does not appear serious at this time. Headaches and vomiting are common following head injury. It should be easy to awaken from sleeping. Sometimes it is necessary for you to stay in the emergency department for a while for observation. Sometimes admission to the hospital may be needed. After injuries such as yours, most problems occur within the first 24 hours, but side effects may occur up to 7-10 days after the injury. It is important for you to carefully monitor your condition and contact your health care provider or seek immediate medical care if there is a change in your condition.  WHAT ARE THE TYPES OF HEAD INJURIES?  Head injuries can be as minor as a bump. Some head injuries can be more severe. More severe head injuries include:  · A jarring injury to the brain (concussion).  · A bruise of the brain (contusion). This mean there is bleeding in the brain that can cause swelling.  · A cracked skull (skull fracture).  · Bleeding in the brain that collects, clots, and forms a bump (hematoma).  WHAT CAUSES A HEAD INJURY?  A serious head injury is most likely to happen to someone who is in a car wreck and is not wearing a seat belt. Other causes of major head injuries include bicycle or motorcycle accidents, sports injuries, and falls.  HOW ARE HEAD INJURIES DIAGNOSED?  A complete history of the event leading to the injury and your current symptoms will be helpful in diagnosing head injuries. Many times, pictures of the brain, such as CT or MRI are needed to see the extent of the injury. Often, an overnight hospital stay is necessary for observation.   WHEN SHOULD I SEEK IMMEDIATE MEDICAL CARE?   You should get help  right away if:  · You have confusion or drowsiness.  · You feel sick to your stomach (nauseous) or have continued, forceful vomiting.  · You have dizziness or unsteadiness that is getting worse.  · You have severe, continued headaches not relieved by medicine. Only take over-the-counter or prescription medicines for pain, fever, or discomfort as directed by your health care provider.  · You do not have normal function of the arms or legs or are unable to walk.  · You notice changes in the black spots in the center of the colored part of your eye (pupil).  · You have a clear or bloody fluid coming from your nose or ears.  · You have a loss of vision.  During the next 24 hours after the injury, you must stay with someone who can watch you for the warning signs. This person should contact local emergency services (1 in the [place] if you have seizures, you become unconscious, or you are unable to wake up.  HOW CAN I PREVENT A HEAD INJURY IN THE FUTURE?  The most important factor for preventing major head injuries is avoiding motor vehicle accidents.  To minimize the potential for damage to your head, it is crucial to wear seat belts while riding in motor vehicles. Wearing helmets while bike riding and playing collision sports (like football) is also helpful. Also, avoiding dangerous activities around the house will further help reduce your risk of head injury.   WHEN CAN I RETURN TO NORMAL ACTIVITIES AND ATHLETICS?  You should be reevaluated by your health care provider before returning to these activities. If you have any of the following symptoms, you should not return to activities or contact sports until 1 week after the symptoms have stopped:  · Persistent headache.  · Dizziness or vertigo.  · Poor attention and concentration.  · Confusion.  · Memory problems.  · Nausea or vomiting.  · Fatigue or tire easily.  · Irritability.  · Intolerant of bright lights or loud noises.  · Anxiety or depression.  · Disturbed  sleep.  MAKE SURE YOU:   · Understand these instructions.  · Will watch your condition.  · Will get help right away if you are not doing well or get worse.     This information is not intended to replace advice given to you by your health care provider. Make sure you discuss any questions you have with your health care provider.     Document Released: 12/18/2006 Document Revised: 01/08/2016 Document Reviewed: 08/25/2014  [employer] Interactive Patient Education ©2017 [employer]

## 2017-12-23 RX ORDER — LORATADINE 10 MG/1
TABLET ORAL
Refills: 2 | COMMUNITY
Start: 2017-12-20 | End: 2018-05-09

## 2018-01-04 ENCOUNTER — CONSULT (OUTPATIENT)
Dept: SLEEP MEDICINE | Facility: HOSPITAL | Age: 33
End: 2018-01-04

## 2018-01-04 VITALS
HEIGHT: 69 IN | BODY MASS INDEX: 32 KG/M2 | OXYGEN SATURATION: 95 % | DIASTOLIC BLOOD PRESSURE: 72 MMHG | SYSTOLIC BLOOD PRESSURE: 138 MMHG | WEIGHT: 216.05 LBS | HEART RATE: 64 BPM

## 2018-01-04 DIAGNOSIS — R06.83 SNORING: Primary | ICD-10-CM

## 2018-01-04 DIAGNOSIS — E66.09 CLASS 1 OBESITY DUE TO EXCESS CALORIES WITHOUT SERIOUS COMORBIDITY WITH BODY MASS INDEX (BMI) OF 31.0 TO 31.9 IN ADULT: ICD-10-CM

## 2018-01-04 DIAGNOSIS — G47.33 OBSTRUCTIVE SLEEP APNEA, ADULT: ICD-10-CM

## 2018-01-04 DIAGNOSIS — G47.26 CIRCADIAN RHYTHM SLEEP DISORDER, SHIFT WORK TYPE: ICD-10-CM

## 2018-01-04 PROCEDURE — 99204 OFFICE O/P NEW MOD 45 MIN: CPT | Performed by: INTERNAL MEDICINE

## 2018-01-04 NOTE — PATIENT INSTRUCTIONS
Sleep Apnea  Sleep apnea is a condition in which breathing pauses or becomes shallow during sleep. Episodes of sleep apnea usually last 10 seconds or longer, and they may occur as many as 20 times an hour. Sleep apnea disrupts your sleep and keeps your body from getting the rest that it needs. This condition can increase your risk of certain health problems, including:  · Heart attack.  · Stroke.  · Obesity.  · Diabetes.  · Heart failure.  · Irregular heartbeat.  There are three kinds of sleep apnea:  · Obstructive sleep apnea. This kind is caused by a blocked or collapsed airway.  · Central sleep apnea. This kind happens when the part of the brain that controls breathing does not send the correct signals to the muscles that control breathing.  · Mixed sleep apnea. This is a combination of obstructive and central sleep apnea.  CAUSES  The most common cause of this condition is a collapsed or blocked airway. An airway can collapse or become blocked if:  · Your throat muscles are abnormally relaxed.  · Your tongue and tonsils are larger than normal.  · You are overweight.  · Your airway is smaller than normal.  RISK FACTORS  This condition is more likely to develop in people who:  · Are overweight.  · Smoke.  · Have a smaller than normal airway.  · Are elderly.  · Are male.  · Drink alcohol.  · Take sedatives or tranquilizers.  · Have a family history of sleep apnea.  SYMPTOMS  Symptoms of this condition include:  · Trouble staying asleep.  · Daytime sleepiness and tiredness.  · Irritability.  · Loud snoring.  · Morning headaches.  · Trouble concentrating.  · Forgetfulness.  · Decreased interest in sex.  · Unexplained sleepiness.  · Mood swings.  · Personality changes.  · Feelings of depression.  · Waking up often during the night to urinate.  · Dry mouth.  · Sore throat.  DIAGNOSIS  This condition may be diagnosed with:  · A medical history.  · A physical exam.  · A series of tests that are done while you are  sleeping (sleep study). These tests are usually done in a sleep lab, but they may also be done at home.  TREATMENT  Treatment for this condition aims to restore normal breathing and to ease symptoms during sleep. It may involve managing health issues that can affect breathing, such as high blood pressure or obesity. Treatment may include:  · Sleeping on your side.  · Using a decongestant if you have nasal congestion.  · Avoiding the use of depressants, including alcohol, sedatives, and narcotics.  · Losing weight if you are overweight.  · Making changes to your diet.  · Quitting smoking.  · Using a device to open your airway while you sleep, such as:    An oral appliance. This is a custom-made mouthpiece that shifts your lower jaw forward.    A continuous positive airway pressure (CPAP) device. This device delivers oxygen to your airway through a mask.    A nasal expiratory positive airway pressure (EPAP) device. This device has valves that you put into each nostril.    A bi-level positive airway pressure (BPAP) device. This device delivers oxygen to your airway through a mask.  · Surgery if other treatments do not work. During surgery, excess tissue is removed to create a wider airway.  It is important to get treatment for sleep apnea. Without treatment, this condition can lead to:  · High blood pressure.  · Coronary artery disease.  · (Men) An inability to achieve or maintain an erection (impotence).  · Reduced thinking abilities.  HOME CARE INSTRUCTIONS  · Make any lifestyle changes that your health care provider recommends.  · Eat a healthy, well-balanced diet.  · Take over-the-counter and prescription medicines only as told by your health care provider.  · Avoid using depressants, including alcohol, sedatives, and narcotics.  · Take steps to lose weight if you are overweight.  · If you were given a device to open your airway while you sleep, use it only as told by your health care provider.  · Do not use any  tobacco products, such as cigarettes, chewing tobacco, and e-cigarettes. If you need help quitting, ask your health care provider.  · Keep all follow-up visits as told by your health care provider. This is important.  SEEK MEDICAL CARE IF:  · The device that you received to open your airway during sleep is uncomfortable or does not seem to be working.  · Your symptoms do not improve.  · Your symptoms get worse.  SEEK IMMEDIATE MEDICAL CARE IF:  · You develop chest pain.  · You develop shortness of breath.  · You develop discomfort in your back, arms, or stomach.  · You have trouble speaking.  · You have weakness on one side of your body.  · You have drooping in your face.  These symptoms may represent a serious problem that is an emergency. Do not wait to see if the symptoms will go away. Get medical help right away. Call your local emergency services (911 in the [place] Do not drive yourself to the hospital.     This information is not intended to replace advice given to you by your health care provider. Make sure you discuss any questions you have with your health care provider.     Document Released: 12/08/2003 Document Revised: 04/10/2017 Document Reviewed: 09/26/2016  [employer] Interactive Patient Education ©2017 [employer]

## 2018-01-05 NOTE — PROGRESS NOTES
Subjective   Jose Conway is a 32 y.o. male is being seen for consultation today at the request of ALEXANDRIA Gaston for the evaluation of snoring and nonrestorative sleep.    History of Present Illness  Patient works nights for [employer].  He complains of not sleeping well.  He has a history of anxiety and depression.  He's had snoring noted all of his life.  He has apneas noted for at least 2 years.  He says he's actually lost about 10 pounds in the past month.  His usual sleep times during the day.  He does get very sleepy during his work time.  He's occasionally sleepy after work.  He has fallen asleep at stop lights.  He denies awakening gasping for breath.  Says he's not rested on awakening.  He has a headache on awakening roughly 2 days per week.  He sleepy even if he increases his time in bed.    He has history of loud snoring and snores in all positions.  He has awakened with a dry mouth and is been told he awakened gasping.  He is been told he stops breathing at night.  He has trouble breathing through his nose and thinks he may broken his nose.  Says usually brief steroid his mouth.    He has occasional reflux symptoms.  He denies hypnagogic hallucinations.  He rarely is noted sleep paralysis and thinks he may have cataplexy on rare occasions.  He says he kicks his legs at night.    He usually goes to sleep.  2 PM.  He will fall asleep fairly quickly.  He awakens 2-3 times during his sleep time.  He arises at 10:30 PM to 11:15 PM.  He thinks he gets about 6 hours of sleep but often is still very tired.  He denies any history of hypertension diabetes coronary artery disease.  He does have a history of depression and anxiety.  Allergies   Allergen Reactions   • Penicillins Anaphylaxis and Rash   • Other Hives     coconuts          Current Outpatient Prescriptions:   •  albuterol (PROVENTIL HFA;VENTOLIN HFA) 108 (90 Base) MCG/ACT inhaler, Inhale 2 puffs Every 4 (Four) Hours As Needed for  Wheezing or Shortness of Air., Disp: 6.7 g, Rfl: 3  •  busPIRone (BUSPAR) 15 MG tablet, Take 1 tablet by mouth 2 (Two) Times a Day., Disp: 60 tablet, Rfl: 1  •  diclofenac (VOLTAREN) 1 % gel gel, Apply 4 g topically 3 (Three) Times a Day., Disp: 100 g, Rfl: 0  •  esomeprazole (nexIUM) 40 MG capsule, Take 1 capsule by mouth Every Morning Before Breakfast., Disp: 30 capsule, Rfl: 2  •  Fluticasone Furoate-Vilanterol (BREO ELLIPTA) 100-25 MCG/INH aerosol powder , Inhale 1 puff Daily. Gargle and spit after puffs, Disp: 1 each, Rfl: 3  •  loperamide (IMODIUM) 2 MG capsule, Take 2 mg by mouth As Needed for Diarrhea., Disp: , Rfl:   •  loratadine (CLARITIN) 10 MG tablet, TK 1 T PO D, Disp: , Rfl: 2  •  naproxen sodium (ALEVE) 220 MG tablet, Take 220 mg by mouth As Needed., Disp: , Rfl:   •  sertraline (ZOLOFT) 50 MG tablet, Take 3 tablets by mouth Daily., Disp: 90 tablet, Rfl: 1    Smoking status: Current Some Day Smoker                                                    Packs/day: 1.00      Years: 16.00        Types: Cigarettes  Smokeless status: Never Used                           History   Alcohol Use   • Yes     Comment: Hasn't had a drink in two weeks  He often has one to 2 drinks per week        Caffeine: He has 4-5 cups of coffee and 4-5 servings of tea each day    Past Medical History:   Diagnosis Date   • Allergic    • Asthma    • Bipolar disease, manic    • Depression    • Peptic ulceration        Past Surgical History:   Procedure Laterality Date   • ADENOIDECTOMY     • CHOLECYSTECTOMY     • PYLOROPLASTY     • TONSILLECTOMY     • WISDOM TOOTH EXTRACTION         Family History   Problem Relation Age of Onset   • Pancreatitis Mother    • Diverticulitis Mother    • Depression Mother    • Anxiety disorder Mother    • Drug abuse Mother    • Hyperlipidemia Mother    • Cancer Father      leuk   • Diabetes Father    • Hyperlipidemia Father    • Diverticulitis Father    • Irritable bowel syndrome Paternal Grandfather   "  Family history is positive for stroke in both his parents have sleep apnea.    The following portions of the patient's history were reviewed and updated as appropriate: allergies, current medications, past family history, past medical history, past social history, past surgical history and problem list.    Review of Systems   Constitutional: Positive for fatigue, fever and unexpected weight change.   HENT: Positive for hearing loss, nosebleeds, postnasal drip, sinus pressure and tinnitus.    Eyes: Negative.    Respiratory: Positive for cough, shortness of breath and wheezing.    Cardiovascular: Negative.    Gastrointestinal: Positive for abdominal distention, abdominal pain, constipation and diarrhea.        Complains of frequent heartburn   Endocrine: Negative.    Genitourinary: Negative.    Musculoskeletal: Positive for back pain and myalgias.   Skin: Negative.    Allergic/Immunologic: Negative.    Neurological: Positive for numbness and headaches.   Psychiatric/Behavioral: Positive for confusion, decreased concentration and dysphoric mood. The patient is nervous/anxious.     Bock scores 19/24    Objective     /72  Pulse 64  Ht 175.3 cm (69.02\")  Wt 98 kg (216 lb 0.8 oz)  SpO2 95%  BMI 31.89 kg/m2     Physical Exam   Constitutional: He is oriented to person, place, and time. He appears well-developed and well-nourished.   He is obese.   HENT:   Head: Normocephalic and atraumatic.   He has nasal narrowing and Mallampati class IV anatomy   Eyes: EOM are normal. Pupils are equal, round, and reactive to light.   Neck: Normal range of motion. Neck supple.   Cardiovascular: Normal rate, regular rhythm and normal heart sounds.    Pulmonary/Chest: Effort normal and breath sounds normal.   Abdominal: Soft. Bowel sounds are normal.   Musculoskeletal: Normal range of motion. He exhibits no edema.   Neurological: He is alert and oriented to person, place, and time.   Skin: Skin is warm and dry.   Psychiatric: " He has a normal mood and affect. His behavior is normal.         Assessment/Plan   Jose was seen today for sleeping problem.    Diagnoses and all orders for this visit:    Snoring  -     Home Sleep Study; Future    Obstructive sleep apnea, adult  -     Home Sleep Study; Future    Class 1 obesity due to excess calories without serious comorbidity with body mass index (BMI) of 31.0 to 31.9 in adult    Circadian rhythm sleep disorder, shift work type     patient has a history of snoring and nonrestorative sleep.  He has a few symptoms suggestive of narcolepsy but these may be related to his chronic sleep deprivation.  He also has disturbs sleep related to his shift work syndrome.  We will plan to proceed first to home sleep testing.  If he continues to have excessive sleepiness after correction of any sleep disordered breathing may need to consider other possibilities.  We've discussed possible therapies including CPAP, weight loss, oral appliances, and surgery.  We've also discussed the possible complications of long-term untreated obstructive sleep apnea.  He is encouraged to lose weight.  He is encouraged to avoid alcohol and sedatives close to bedtime.  He is encouraged practice lateral position sleep.         Hector Novak MD [facility]  Sleep Medicine  Pulmonary and Critical Care Medicine

## 2018-01-19 ENCOUNTER — HOSPITAL ENCOUNTER (OUTPATIENT)
Dept: SLEEP MEDICINE | Facility: HOSPITAL | Age: 33
End: 2018-01-19
Attending: INTERNAL MEDICINE

## 2018-01-19 VITALS
WEIGHT: 220.46 LBS | BODY MASS INDEX: 31.56 KG/M2 | DIASTOLIC BLOOD PRESSURE: 72 MMHG | OXYGEN SATURATION: 95 % | HEART RATE: 94 BPM | SYSTOLIC BLOOD PRESSURE: 133 MMHG | HEIGHT: 70 IN

## 2018-01-19 DIAGNOSIS — R06.83 SNORING: ICD-10-CM

## 2018-01-19 DIAGNOSIS — G47.33 OBSTRUCTIVE SLEEP APNEA, ADULT: ICD-10-CM

## 2018-01-19 PROCEDURE — 95806 SLEEP STUDY UNATT&RESP EFFT: CPT | Performed by: INTERNAL MEDICINE

## 2018-01-19 PROCEDURE — 95806 SLEEP STUDY UNATT&RESP EFFT: CPT

## 2018-01-23 ENCOUNTER — TELEPHONE (OUTPATIENT)
Dept: SLEEP MEDICINE | Facility: HOSPITAL | Age: 33
End: 2018-01-23

## 2018-01-23 DIAGNOSIS — G47.33 OBSTRUCTIVE SLEEP APNEA: Primary | ICD-10-CM

## 2018-01-23 DIAGNOSIS — R06.83 SNORING: ICD-10-CM

## 2018-01-23 NOTE — TELEPHONE ENCOUNTER
"I spoke with Mr. Conway.  Upon answering the phone he stated, \"that it didn't go so well\".  I confirmed his suspicions that the equipment did not stay on as he already knew.  I also relayed to him that with his inability to keep the equipment on that Dr. Novak would like to study in-lab so that if something does come off the technologist can come in and put back on.  "

## 2018-02-02 ENCOUNTER — HOSPITAL ENCOUNTER (OUTPATIENT)
Dept: SLEEP MEDICINE | Facility: HOSPITAL | Age: 33
Discharge: HOME | End: 2018-02-02
Attending: INTERNAL MEDICINE | Admitting: INTERNAL MEDICINE

## 2018-02-02 VITALS
HEART RATE: 88 BPM | SYSTOLIC BLOOD PRESSURE: 127 MMHG | OXYGEN SATURATION: 96 % | DIASTOLIC BLOOD PRESSURE: 64 MMHG | BODY MASS INDEX: 31.18 KG/M2 | WEIGHT: 217.81 LBS | HEIGHT: 70 IN

## 2018-02-02 DIAGNOSIS — R06.83 SNORING: ICD-10-CM

## 2018-02-02 DIAGNOSIS — G47.33 OBSTRUCTIVE SLEEP APNEA: ICD-10-CM

## 2018-02-02 PROCEDURE — 95810 POLYSOM 6/> YRS 4/> PARAM: CPT | Performed by: INTERNAL MEDICINE

## 2018-02-02 PROCEDURE — 95810 POLYSOM 6/> YRS 4/> PARAM: CPT

## 2018-03-06 DIAGNOSIS — R91.1 LUNG NODULE: Primary | ICD-10-CM

## 2018-03-23 ENCOUNTER — OFFICE VISIT (OUTPATIENT)
Dept: FAMILY MEDICINE CLINIC | Facility: CLINIC | Age: 33
End: 2018-03-23

## 2018-03-23 VITALS
HEIGHT: 69 IN | DIASTOLIC BLOOD PRESSURE: 64 MMHG | SYSTOLIC BLOOD PRESSURE: 124 MMHG | RESPIRATION RATE: 20 BRPM | BODY MASS INDEX: 33.18 KG/M2 | WEIGHT: 224 LBS | OXYGEN SATURATION: 99 % | TEMPERATURE: 98.2 F | HEART RATE: 82 BPM

## 2018-03-23 DIAGNOSIS — Z76.0 MEDICATION REFILL: ICD-10-CM

## 2018-03-23 DIAGNOSIS — F41.9 ANXIETY: Primary | ICD-10-CM

## 2018-03-23 DIAGNOSIS — F32.A DEPRESSION, UNSPECIFIED DEPRESSION TYPE: ICD-10-CM

## 2018-03-23 PROCEDURE — 99213 OFFICE O/P EST LOW 20 MIN: CPT | Performed by: NURSE PRACTITIONER

## 2018-03-23 RX ORDER — SERTRALINE HYDROCHLORIDE 100 MG/1
200 TABLET, FILM COATED ORAL DAILY
Qty: 60 TABLET | Refills: 2 | Status: SHIPPED | OUTPATIENT
Start: 2018-03-23 | End: 2018-09-12

## 2018-03-23 RX ORDER — BUSPIRONE HYDROCHLORIDE 15 MG/1
30 TABLET ORAL 2 TIMES DAILY
Qty: 120 TABLET | Refills: 2 | Status: SHIPPED | OUTPATIENT
Start: 2018-03-23 | End: 2018-09-12

## 2018-03-23 NOTE — PROGRESS NOTES
Subjective   Jose Conway is a 32 y.o. male.     Anxiety   Presents for follow-up visit. Symptoms include depressed mood and nervous/anxious behavior. Patient reports no chest pain, compulsions, confusion, decreased concentration, excessive worry, malaise, nausea, obsessions, palpitations or shortness of breath. Symptoms occur most days. The severity of symptoms is moderate. The quality of sleep is fair.     His past medical history is significant for depression. Treatment side effects: none.   Depression   Visit Type: follow-up  Patient presents with the following symptoms: depressed mood and nervousness/anxiety.  Patient is not experiencing: chest pain, compulsions, confusion, decreased concentration, excessive worry, feelings of hopelessness, malaise, obsessions, palpitations and shortness of breath.  Frequency of symptoms: most days   Severity: moderate   Treatment side effects: none.      The following portions of the patient's history were reviewed and updated as appropriate: allergies, current medications, past family history, past medical history, past social history, past surgical history and problem list.     Allergies   Allergen Reactions   • Penicillins Anaphylaxis and Rash   • Other Hives     coconuts     Review of Systems   Constitutional: Negative.    Respiratory: Negative for shortness of breath.    Cardiovascular: Negative for chest pain and palpitations.   Gastrointestinal: Negative for nausea.   Psychiatric/Behavioral: Positive for dysphoric mood. Negative for confusion and decreased concentration. The patient is nervous/anxious.         Patient reports anxiety is better but not completely relieved-under more stress at work now but optimistic may get a promotion. Patient reports depression is more noticeable recently. States still has not had contact with his brother and has not been able to see his niece. Patient states he is going to counseling now as well.       Objective   Physical  Exam   Constitutional: He is oriented to person, place, and time. Vital signs are normal. He appears well-developed and well-nourished. He is cooperative. No distress.   Cardiovascular: Normal rate.    Pulmonary/Chest: Effort normal.   Neurological: He is alert and oriented to person, place, and time.   Skin: Skin is warm and dry. He is not diaphoretic.   Psychiatric: He has a normal mood and affect. His speech is normal and behavior is normal. Judgment and thought content normal. Cognition and memory are normal.   Patient much more optimistic than at previous visits-has started bee keeping and walking his dogs after work.      Vitals:    03/23/18 0836   BP: 124/64   Pulse: 82   Resp: 20   Temp: 98.2 °F (36.8 °C)   SpO2: 99%     Assessment/Plan   Jose was seen today for depression.    Diagnoses and all orders for this visit:    Anxiety  -     sertraline (ZOLOFT) 100 MG tablet; Take 2 tablets by mouth Daily.  -     busPIRone (BUSPAR) 15 MG tablet; Take 2 tablets by mouth 2 (Two) Times a Day.    Depression, unspecified depression type  -     sertraline (ZOLOFT) 100 MG tablet; Take 2 tablets by mouth Daily.    Medication refill  -     sertraline (ZOLOFT) 100 MG tablet; Take 2 tablets by mouth Daily.  -     busPIRone (BUSPAR) 15 MG tablet; Take 2 tablets by mouth 2 (Two) Times a Day.       Zoloft increased to 200mg/day from 150mg/day. Buspar increased to 60mg/day from 30mg/day. Patient is to continue with [facility] for counseling services.  Discussed the nature of the medical condition(s) risks, complications, implications, management, safe and proper use of medications. Encouraged medication compliance, and keeping scheduled follow up appointments with me and any other providers.

## 2018-03-23 NOTE — PATIENT INSTRUCTIONS
Generalized Anxiety Disorder, Adult  Generalized anxiety disorder (ROSA) is a mental health disorder. People with this condition constantly worry about everyday events. Unlike normal anxiety, worry related to ROSA is not triggered by a specific event. These worries also do not fade or get better with time. ROSA interferes with life functions, including relationships, work, and school.  ROSA can vary from mild to severe. People with severe ROSA can have intense waves of anxiety with physical symptoms (panic attacks).  What are the causes?  The exact cause of ROSA is not known.  What increases the risk?  This condition is more likely to develop in:  · Women.  · People who have a family history of anxiety disorders.  · People who are very shy.  · People who experience very stressful life events, such as the death of a loved one.  · People who have a very stressful family environment.  What are the signs or symptoms?  People with ROSA often worry excessively about many things in their lives, such as their health and family. They may also be overly concerned about:  · Doing well at work.  · Being on time.  · Natural disasters.  · Friendships.  Physical symptoms of ROSA include:  · Fatigue.  · Muscle tension or having muscle twitches.  · Trembling or feeling shaky.  · Being easily startled.  · Feeling like your heart is pounding or racing.  · Feeling out of breath or like you cannot take a deep breath.  · Having trouble falling asleep or staying asleep.  · Sweating.  · Nausea, diarrhea, or irritable bowel syndrome (IBS).  · Headaches.  · Trouble concentrating or remembering facts.  · Restlessness.  · Irritability.  How is this diagnosed?  Your health care provider can diagnose ROSA based on your symptoms and medical history. You will also have a physical exam. The health care provider will ask specific questions about your symptoms, including how severe they are, when they started, and if they come and go. Your health care  provider may ask you about your use of alcohol or drugs, including prescription medicines. Your health care provider may refer you to a mental health specialist for further evaluation.  Your health care provider will do a thorough examination and may perform additional tests to rule out other possible causes of your symptoms.  To be diagnosed with ROSA, a person must have anxiety that:  · Is out of his or her control.  · Affects several different aspects of his or her life, such as work and relationships.  · Causes distress that makes him or her unable to take part in normal activities.  · Includes at least three physical symptoms of ROSA, such as restlessness, fatigue, trouble concentrating, irritability, muscle tension, or sleep problems.  Before your health care provider can confirm a diagnosis of ROSA, these symptoms must be present more days than they are not, and they must last for six months or longer.  How is this treated?  The following therapies are usually used to treat ROSA:  · Medicine. Antidepressant medicine is usually prescribed for long-term daily control. Antianxiety medicines may be added in severe cases, especially when panic attacks occur.  · Talk therapy (psychotherapy). Certain types of talk therapy can be helpful in treating ROSA by providing support, education, and guidance. Options include:  ¨ Cognitive behavioral therapy (CBT). People learn coping skills and techniques to ease their anxiety. They learn to identify unrealistic or negative thoughts and behaviors and to replace them with positive ones.  ¨ Acceptance and commitment therapy (ACT). This treatment teaches people how to be mindful as a way to cope with unwanted thoughts and feelings.  ¨ Biofeedback. This process trains you to manage your body's response (physiological response) through breathing techniques and relaxation methods. You will work with a therapist while machines are used to monitor your physical symptoms.  · Stress  management techniques. These include yoga, meditation, and exercise.  A mental health specialist can help determine which treatment is best for you. Some people see improvement with one type of therapy. However, other people require a combination of therapies.  Follow these instructions at home:  · Take over-the-counter and prescription medicines only as told by your health care provider.  · Try to maintain a normal routine.  · Try to anticipate stressful situations and allow extra time to manage them.  · Practice any stress management or self-calming techniques as taught by your health care provider.  · Do not punish yourself for setbacks or for not making progress.  · Try to recognize your accomplishments, even if they are small.  · Keep all follow-up visits as told by your health care provider. This is important.  Contact a health care provider if:  · Your symptoms do not get better.  · Your symptoms get worse.  · You have signs of depression, such as:  ¨ A persistently sad, cranky, or irritable mood.  ¨ Loss of enjoyment in activities that used to bring you kisha.  ¨ Change in weight or eating.  ¨ Changes in sleeping habits.  ¨ Avoiding friends or family members.  ¨ Loss of energy for normal tasks.  ¨ Feelings of guilt or worthlessness.  Get help right away if:  · You have serious thoughts about hurting yourself or others.  If you ever feel like you may hurt yourself or others, or have thoughts about taking your own life, get help right away. You can go to your nearest emergency department or call:  · Your local emergency services (911 in the [place]  · A suicide crisis helpline, such as the National Suicide Prevention Lifeline at 1-741.242.1604. This is open 24 hours a day.  Summary  · Generalized anxiety disorder (ROSA) is a mental health disorder that involves worry that is not triggered by a specific event.  · People with ROSA often worry excessively about many things in their lives, such as their health and  family.  · ROSA may cause physical symptoms such as restlessness, trouble concentrating, sleep problems, frequent sweating, nausea, diarrhea, headaches, and trembling or muscle twitching.  · A mental health specialist can help determine which treatment is best for you. Some people see improvement with one type of therapy. However, other people require a combination of therapies.  This information is not intended to replace advice given to you by your health care provider. Make sure you discuss any questions you have with your health care provider.  Document Released: 04/14/2014 Document Revised: 11/07/2017 Document Reviewed: 11/07/2017  [employer] Interactive Patient Education © 2017 [employer]    Major Depressive Disorder, Adult  Major depressive disorder (MDD) is a mental health condition. It may also be called clinical depression or unipolar depression. MDD usually causes feelings of sadness, hopelessness, or helplessness. MDD can also cause physical symptoms. It can interfere with work, school, relationships, and other everyday activities. MDD may be mild, moderate, or severe. It may occur once (single episode major depressive disorder) or it may occur multiple times (recurrent major depressive disorder).  What are the causes?  The exact cause of this condition is not known. MDD is most likely caused by a combination of things, which may include:  · Genetic factors. These are traits that are passed along from parent to child.  · Individual factors. Your personality, your behavior, and the way you handle your thoughts and feelings may contribute to MDD. This includes personality traits and behaviors learned from others.  · Physical factors, such as:  ¨ Differences in the part of your brain that controls emotion. This part of your brain may be different than it is in people who do not have MDD.  ¨ Long-term (chronic) medical or psychiatric illnesses.  · Social factors. Traumatic experiences or major life changes  may play a role in the development of MDD.  What increases the risk?  This condition is more likely to develop in women. The following factors may also make you more likely to develop MDD:  · A family history of depression.  · Troubled family relationships.  · Abnormally low levels of certain brain chemicals.  · Traumatic events in childhood, especially abuse or the loss of a parent.  · Being under a lot of stress, or long-term stress, especially from upsetting life experiences or losses.  · A history of:  ¨ Chronic physical illness.  ¨ Other mental health disorders.  ¨ Substance abuse.  · Poor living conditions.  · Experiencing social exclusion or discrimination on a regular basis.  What are the signs or symptoms?  The main symptoms of MDD typically include:  · Constant depressed or irritable mood.  · Loss of interest in things and activities.  MDD symptoms may also include:  · Sleeping or eating too much or too little.  · Unexplained weight change.  · Fatigue or low energy.  · Feelings of worthlessness or guilt.  · Difficulty thinking clearly or making decisions.  · Thoughts of suicide or of harming others.  · Physical agitation or weakness.  · Isolation.  Severe cases of MDD may also occur with other symptoms, such as:  · Delusions or hallucinations, in which you imagine things that are not real (psychotic depression).  · Low-level depression that lasts at least a year (chronic depression or persistent depressive disorder).  · Extreme sadness and hopelessness (melancholic depression).  · Trouble speaking and moving (catatonic depression).  How is this diagnosed?  This condition may be diagnosed based on:  · Your symptoms.  · Your medical history, including your mental health history. This may involve tests to evaluate your mental health. You may be asked questions about your lifestyle, including any drug and alcohol use, and how long you have had symptoms of MDD.  · A physical exam.  · Blood tests to rule out  other conditions.  You must have a depressed mood and at least four other MDD symptoms most of the day, nearly every day in the same 2-week timeframe before your health care provider can confirm a diagnosis of MDD.  How is this treated?  This condition is usually treated by mental health professionals, such as psychologists, psychiatrists, and clinical social workers. You may need more than one type of treatment. Treatment may include:  · Psychotherapy. This is also called talk therapy or counseling. Types of psychotherapy include:  ¨ Cognitive behavioral therapy (CBT). This type of therapy teaches you to recognize unhealthy feelings, thoughts, and behaviors, and replace them with positive thoughts and actions.  ¨ Interpersonal therapy (IPT). This helps you to improve the way you relate to and communicate with others.  ¨ Family therapy. This treatment includes members of your family.  · Medicine to treat anxiety and depression, or to help you control certain emotions and behaviors.  · Lifestyle changes, such as:  ¨ Limiting alcohol and drug use.  ¨ Exercising regularly.  ¨ Getting plenty of sleep.  ¨ Making healthy eating choices.  ¨ Spending more time outdoors.  Treatments involving stimulation of the brain can be used in situations with extremely severe symptoms, or when medicine or other therapies do not work over time. These treatments include electroconvulsive therapy, transcranial magnetic stimulation, and vagal nerve stimulation.  Follow these instructions at home:  Activity   · Return to your normal activities as told by your health care provider.  · Exercise regularly and spend time outdoors as told by your health care provider.  General instructions   · Take over-the-counter and prescription medicines only as told by your health care provider.  · Do not drink alcohol. If you drink alcohol, limit your alcohol intake to no more than 1 drink a day for nonpregnant women and 2 drinks a day for men. One drink  equals 12 oz of beer, 5 oz of wine, or 1½ oz of hard liquor. Alcohol can affect any antidepressant medicines you are taking. Talk to your health care provider about your alcohol use.  · Eat a healthy diet and get plenty of sleep.  · Find activities that you enjoy doing, and make time to do them.  · Consider joining a support group. Your health care provider may be able to recommend a support group.  · Keep all follow-up visits as told by your health care provider. This is important.  Where to find more information:  National [place] on Mental Illness  · www.[employer].org  [place] National [place] of Mental Health  · www.[employer].nih.gov  National Suicide Prevention Lifeline  · 7-660-069-TALK (3406). This is free, 24-hour help.  Contact a health care provider if:  · Your symptoms get worse.  · You develop new symptoms.  Get help right away if:  · You self-harm.  · You have serious thoughts about hurting yourself or others.  · You see, hear, taste, smell, or feel things that are not present (hallucinate).  This information is not intended to replace advice given to you by your health care provider. Make sure you discuss any questions you have with your health care provider.  Document Released: 04/14/2014 Document Revised: 08/24/2017 Document Reviewed: 06/28/2017  [employer] Interactive Patient Education © 2017 [employer]

## 2018-03-27 ENCOUNTER — HOSPITAL ENCOUNTER (OUTPATIENT)
Dept: CT IMAGING | Facility: HOSPITAL | Age: 33
Discharge: HOME | End: 2018-03-27
Admitting: NURSE PRACTITIONER

## 2018-03-27 DIAGNOSIS — R91.1 LUNG NODULE: ICD-10-CM

## 2018-03-27 PROCEDURE — 71250 CT THORAX DX C-: CPT

## 2018-04-19 ENCOUNTER — OFFICE VISIT (OUTPATIENT)
Dept: PULMONOLOGY | Facility: CLINIC | Age: 33
End: 2018-04-19

## 2018-04-19 VITALS
RESPIRATION RATE: 16 BRPM | TEMPERATURE: 97.4 F | BODY MASS INDEX: 33.33 KG/M2 | OXYGEN SATURATION: 96 % | HEIGHT: 69 IN | WEIGHT: 225 LBS | SYSTOLIC BLOOD PRESSURE: 118 MMHG | HEART RATE: 76 BPM | DIASTOLIC BLOOD PRESSURE: 76 MMHG

## 2018-04-19 DIAGNOSIS — J45.40 MODERATE PERSISTENT ASTHMA WITHOUT COMPLICATION: Primary | ICD-10-CM

## 2018-04-19 DIAGNOSIS — R91.1 PULMONARY NODULE: ICD-10-CM

## 2018-04-19 DIAGNOSIS — Z72.0 TOBACCO ABUSE: ICD-10-CM

## 2018-04-19 PROCEDURE — 99213 OFFICE O/P EST LOW 20 MIN: CPT | Performed by: INTERNAL MEDICINE

## 2018-04-19 PROCEDURE — 99406 BEHAV CHNG SMOKING 3-10 MIN: CPT | Performed by: INTERNAL MEDICINE

## 2018-04-19 RX ORDER — MONTELUKAST SODIUM 10 MG/1
10 TABLET ORAL NIGHTLY
Qty: 30 TABLET | Refills: 11 | Status: SHIPPED | OUTPATIENT
Start: 2018-04-19 | End: 2019-05-07 | Stop reason: SDUPTHER

## 2018-04-19 NOTE — PROGRESS NOTES
Pulmonary Office Follow Up      Subjective   Chief Complaint: Shortness of Breath    Jose Conway is a 32 y.o. male is being seen in follow up for Asthma, Pulmonary Nodules    History of Present Illness    Mr. Conway is a 33yo M who was referred to clinic for a pulmonary nodule. He reports that he had a CT scan of the chest performed secondary to shortness of breath and frequent episodes of bronchitis. A CT Chest performed on 9/16/17 showed a 7mm right upper lobe pulmonary nodule. He reports that he previously had a 5mm RUL nodule discovered on a CT scan of the chest in 2015 at [facility] in [facility]. He was supposed to follow up with a repeat CT scan of the chest in 6 months but did not secondary to insurance reasons.      He has a history of asthma but had not been using any inhalers.     He was last seen on 11/13/18. At that time, he was started on Asmanex 220mcg and counseled to quit smoking.     He returns today after a repeat CT scan of the chest. He has been switched from Asmanex to Breo by his pcp. He feels this is currently controlling his asthma symptoms well. He is using his Albuterol 3-4x weekly. He is not having any nighttime symptoms. He does have allergies and is currently using Claritin with some relief. He is still smoking and using e-cigs.        The following portions of the patient's history were reviewed and updated as appropriate: allergies, current medications, past family history, past medical history, past social history, past surgical history and problem list.    Review of Systems   Constitutional: Positive for appetite change, chills and fatigue.   HENT: Positive for congestion and postnasal drip.    Eyes: Positive for redness.   Respiratory: Positive for cough, shortness of breath and wheezing.    Cardiovascular: Negative.    Gastrointestinal: Positive for diarrhea and nausea.   Endocrine: Positive for cold intolerance.   Genitourinary: Negative.    Musculoskeletal:  "Negative.    Skin: Negative.    Allergic/Immunologic: Negative.    Neurological: Positive for headaches.   Hematological: Negative.    Psychiatric/Behavioral: Positive for dysphoric mood. The patient is nervous/anxious.          Objective   Blood pressure 118/76, pulse 76, temperature 97.4 °F (36.3 °C), resp. rate 16, height 175.3 cm (69\"), weight 102 kg (225 lb), SpO2 96 %.  Physical Exam   Constitutional: He is oriented to person, place, and time. He appears well-developed and well-nourished. No distress.   HENT:   Head: Normocephalic and atraumatic.   Mouth/Throat: Oropharynx is clear and moist.   Eyes: Conjunctivae are normal. Pupils are equal, round, and reactive to light. No scleral icterus.   Neck: Normal range of motion. Neck supple. No tracheal deviation present. No thyromegaly present.   Cardiovascular: Normal rate, regular rhythm and normal heart sounds.    Pulmonary/Chest: Effort normal and breath sounds normal. No respiratory distress.   Abdominal: Soft. Bowel sounds are normal. There is no tenderness.   Musculoskeletal: Normal range of motion. He exhibits no edema.   Lymphadenopathy:     He has no cervical adenopathy.   Neurological: He is alert and oriented to person, place, and time. He exhibits normal muscle tone. Coordination normal.   Skin: Skin is warm and dry. No rash noted. No erythema.   Psychiatric: He has a normal mood and affect. His speech is normal and behavior is normal. Judgment normal.   Nursing note and vitals reviewed.      PFTs:  No new PFTs.     Imaging:  CT chest from 3/27/18 personally reviewed. There is a 7.6mm nodule in the right upper lobe which is not significantly changed. There are bilateral micronodules which are also unchanged.     Assessment/Plan   Jose was seen today for shortness of breath.    Diagnoses and all orders for this visit:    Moderate persistent asthma without complication    Pulmonary nodule    Tobacco abuse    Other orders  -     montelukast (SINGULAIR) " 10 MG tablet; Take 1 tablet by mouth Every Night.        Discussion:  Mr. Conway is a 31yo M who is referred for further management of a solitary pulmonary nodule.      1. Right Upper Lobe Pulmonary Nodule  - He does have a history of smoking, but based on his age, he is low risk for a primary lung cancer.   - Repeat CT scan in March was stable.   - Repeat CT chest in another 6 months.      2. Moderate Persistent Asthma  - Continue Breo  - Add Singulair      3. Tobacco Abuse  - Still occasionally using traditional cigarettes.   - < 10 minutes spent on smoking cessation counseling.            I personally spent over half of a total 15 minutes face to face with the patient in counseling and discussion and/or coordination of care as described above.     Catherine V Case, DO  Pulmonary and Critical Care Medicine

## 2018-04-23 PROBLEM — R91.1 PULMONARY NODULE: Status: ACTIVE | Noted: 2018-04-23

## 2018-04-23 PROBLEM — Z72.0 TOBACCO ABUSE: Status: ACTIVE | Noted: 2018-04-23

## 2018-04-23 PROBLEM — J45.909 ASTHMA: Status: ACTIVE | Noted: 2018-04-23

## 2018-04-25 DIAGNOSIS — R91.1 LUNG NODULE: Primary | ICD-10-CM

## 2018-05-07 DIAGNOSIS — J30.9 ALLERGIC RHINITIS, UNSPECIFIED CHRONICITY, UNSPECIFIED SEASONALITY, UNSPECIFIED TRIGGER: ICD-10-CM

## 2018-05-09 RX ORDER — LORATADINE 10 MG/1
TABLET ORAL
Qty: 30 TABLET | Refills: 0 | Status: SHIPPED | OUTPATIENT
Start: 2018-05-09 | End: 2018-07-20 | Stop reason: SDUPTHER

## 2018-06-22 DIAGNOSIS — M25.511 RIGHT SHOULDER PAIN, UNSPECIFIED CHRONICITY: ICD-10-CM

## 2018-07-20 DIAGNOSIS — Z76.0 MEDICATION REFILL: ICD-10-CM

## 2018-07-20 DIAGNOSIS — J45.30 MILD PERSISTENT ASTHMA, UNSPECIFIED WHETHER COMPLICATED: ICD-10-CM

## 2018-07-20 DIAGNOSIS — J30.9 ALLERGIC RHINITIS, UNSPECIFIED CHRONICITY, UNSPECIFIED SEASONALITY, UNSPECIFIED TRIGGER: ICD-10-CM

## 2018-07-21 RX ORDER — LORATADINE 10 MG/1
TABLET ORAL
Qty: 30 TABLET | Refills: 0 | Status: SHIPPED | OUTPATIENT
Start: 2018-07-21 | End: 2018-08-19 | Stop reason: SDUPTHER

## 2018-08-19 DIAGNOSIS — Z76.0 MEDICATION REFILL: ICD-10-CM

## 2018-08-19 DIAGNOSIS — J45.30 MILD PERSISTENT ASTHMA, UNSPECIFIED WHETHER COMPLICATED: ICD-10-CM

## 2018-08-19 DIAGNOSIS — J30.9 ALLERGIC RHINITIS, UNSPECIFIED CHRONICITY, UNSPECIFIED SEASONALITY, UNSPECIFIED TRIGGER: ICD-10-CM

## 2018-08-20 RX ORDER — LORATADINE 10 MG/1
TABLET ORAL
Qty: 30 TABLET | Refills: 0 | Status: SHIPPED | OUTPATIENT
Start: 2018-08-20 | End: 2018-09-21 | Stop reason: SDUPTHER

## 2018-08-21 ENCOUNTER — HOSPITAL ENCOUNTER (OUTPATIENT)
Dept: CT IMAGING | Facility: HOSPITAL | Age: 33
Discharge: HOME | End: 2018-08-21
Attending: INTERNAL MEDICINE | Admitting: INTERNAL MEDICINE

## 2018-08-21 DIAGNOSIS — R91.1 LUNG NODULE: ICD-10-CM

## 2018-08-21 PROCEDURE — 71250 CT THORAX DX C-: CPT

## 2018-08-31 DIAGNOSIS — R12 HEARTBURN: ICD-10-CM

## 2018-09-01 RX ORDER — ESOMEPRAZOLE MAGNESIUM 40 MG/1
CAPSULE, DELAYED RELEASE ORAL
Qty: 30 CAPSULE | Refills: 0 | Status: SHIPPED | OUTPATIENT
Start: 2018-09-01 | End: 2018-09-01 | Stop reason: SDUPTHER

## 2018-09-01 RX ORDER — ESOMEPRAZOLE MAGNESIUM 40 MG/1
40 CAPSULE, DELAYED RELEASE ORAL DAILY
Qty: 30 CAPSULE | Refills: 1 | Status: SHIPPED | OUTPATIENT
Start: 2018-09-01 | End: 2018-09-05

## 2018-09-01 NOTE — TELEPHONE ENCOUNTER
merissa juares let pt know that I'm giving Nexium refill for 2 months. Additional refill will require office visit.  Pt was last seen in Oct 2017

## 2018-09-05 RX ORDER — OMEPRAZOLE 40 MG/1
40 CAPSULE, DELAYED RELEASE ORAL EVERY MORNING
Qty: 60 CAPSULE | Refills: 0 | Status: SHIPPED | OUTPATIENT
Start: 2018-09-05 | End: 2018-12-16

## 2018-09-05 NOTE — TELEPHONE ENCOUNTER
Called patient and informed him that his Nexium script was changed to Omeprazole do to Insurance guidelines. Patient voiced understanding. He will call back if medication doesn't help.

## 2018-09-12 ENCOUNTER — OFFICE VISIT (OUTPATIENT)
Dept: FAMILY MEDICINE CLINIC | Facility: CLINIC | Age: 33
End: 2018-09-12

## 2018-09-12 VITALS
TEMPERATURE: 98.2 F | BODY MASS INDEX: 33.08 KG/M2 | DIASTOLIC BLOOD PRESSURE: 72 MMHG | WEIGHT: 224 LBS | SYSTOLIC BLOOD PRESSURE: 118 MMHG | OXYGEN SATURATION: 98 % | HEART RATE: 85 BPM | RESPIRATION RATE: 18 BRPM

## 2018-09-12 DIAGNOSIS — G89.29 CHRONIC RIGHT SHOULDER PAIN: ICD-10-CM

## 2018-09-12 DIAGNOSIS — Z72.0 TOBACCO ABUSE: ICD-10-CM

## 2018-09-12 DIAGNOSIS — Z91.09 ENVIRONMENTAL ALLERGIES: Primary | ICD-10-CM

## 2018-09-12 DIAGNOSIS — M25.511 CHRONIC RIGHT SHOULDER PAIN: ICD-10-CM

## 2018-09-12 PROCEDURE — 99213 OFFICE O/P EST LOW 20 MIN: CPT | Performed by: NURSE PRACTITIONER

## 2018-09-12 RX ORDER — LAMOTRIGINE 100 MG/1
TABLET ORAL
Refills: 2 | COMMUNITY
Start: 2018-08-16

## 2018-09-12 RX ORDER — PROPRANOLOL HYDROCHLORIDE 10 MG/1
TABLET ORAL
Refills: 0 | COMMUNITY
Start: 2018-07-20

## 2018-09-12 RX ORDER — CETIRIZINE HYDROCHLORIDE 10 MG/1
10 TABLET ORAL DAILY
COMMUNITY
End: 2018-09-22

## 2018-09-12 RX ORDER — HYDROXYZINE HYDROCHLORIDE 10 MG/1
TABLET, FILM COATED ORAL
Refills: 2 | COMMUNITY
Start: 2018-08-16

## 2018-09-12 RX ORDER — CLONAZEPAM 0.5 MG/1
TABLET ORAL
Refills: 0 | COMMUNITY
Start: 2018-08-30

## 2018-09-12 NOTE — PROGRESS NOTES
Subjective   Jose Conway is a 32 y.o. male.     Upper Extremity Issue   This is a chronic problem. The current episode started more than 1 year ago. The problem occurs intermittently. The problem has been gradually worsening. Associated symptoms include arthralgias and congestion. Pertinent negatives include no chills, diaphoresis, fatigue, fever, joint swelling, neck pain, numbness or sore throat. The symptoms are aggravated by exertion. He has tried NSAIDs for the symptoms. The treatment provided no relief.       The following portions of the patient's history were reviewed and updated as appropriate: allergies, current medications, past family history, past medical history, past social history, past surgical history and problem list.     Allergies   Allergen Reactions   • Penicillins Anaphylaxis and Rash   • Other Hives     coconuts     Current Outpatient Prescriptions on File Prior to Visit   Medication Sig   • albuterol (PROVENTIL HFA;VENTOLIN HFA) 108 (90 Base) MCG/ACT inhaler Inhale 2 puffs Every 4 (Four) Hours As Needed for Wheezing or Shortness of Air.   • BREO ELLIPTA 100-25 MCG/INH aerosol powder  INHALE 1 PUFF BY MOUTH EVERY DAY. GARGLE AND SPIT AFTER PUFFS   • diclofenac (VOLTAREN) 1 % gel gel APPLY 4 GRAMS EXTERNALLY TO THE AFFECTED AREA THREE TIMES DAILY   • loperamide (IMODIUM) 2 MG capsule Take 2 mg by mouth As Needed for Diarrhea.   • loratadine (CLARITIN) 10 MG tablet TAKE 1 TABLET BY MOUTH DAILY   • montelukast (SINGULAIR) 10 MG tablet Take 1 tablet by mouth Every Night.   • omeprazole (priLOSEC) 40 MG capsule Take 1 capsule by mouth Every Morning. Take first thing in the morning on an empty stomach.  Wait at least 30 min to 1hr before eating.   • [DISCONTINUED] naproxen sodium (ALEVE) 220 MG tablet Take 220 mg by mouth As Needed.   • [DISCONTINUED] busPIRone (BUSPAR) 15 MG tablet Take 2 tablets by mouth 2 (Two) Times a Day.   • [DISCONTINUED] sertraline (ZOLOFT) 100 MG tablet Take 2  "tablets by mouth Daily.     No current facility-administered medications on file prior to visit.      Review of Systems   Constitutional: Negative for appetite change, chills, diaphoresis, fatigue and fever.   HENT: Positive for congestion and sinus pressure. Negative for sore throat, tinnitus and trouble swallowing.    Respiratory: Negative.         History of tobacco abuse. Wants to try to quit smoking.   Cardiovascular: Negative.    Gastrointestinal: Negative.    Genitourinary: Negative.    Musculoskeletal: Positive for arthralgias. Negative for joint swelling and neck pain.   Allergic/Immunologic: Positive for environmental allergies (States unable to \"get allergies under control\" and requests referral to an allergist).   Neurological: Negative.  Negative for dizziness and numbness.   Hematological: Negative.    Psychiatric/Behavioral:        Patient states he is currently being treated for his anxiety and depression at [facility]. States he has a meeting this week with a  there to determine a psychiatric provider for a longer treatment term. Patient states that he feels that his current medications are working.       Objective   Physical Exam  Vitals:    09/12/18 1132   BP: 118/72   Pulse: 85   Resp: 18   Temp: 98.2 °F (36.8 °C)   SpO2: 98%     Assessment/Plan   Jose was seen today for depression and anxiety.    Diagnoses and all orders for this visit:    Environmental allergies  -     Ambulatory Referral to Allergy    Chronic right shoulder pain  -     Ambulatory Referral to Orthopedic Surgery    Tobacco abuse  -     varenicline (CHANTIX STARTING MONTH PAK) 0.5 MG X 11 & 1 MG X 42 tablet; Take 0.5 mg one daily on days 1-2 and 0.5 mg twice daily on days 4-7. Then 1 mg twice daily for a total of 12 weeks.       Discussed the nature of the medical condition(s) risks, complications, implications, management, safe and proper use of medications. Encouraged medication " compliance, and keeping scheduled follow up appointments with me and any other providers.

## 2018-09-12 NOTE — PATIENT INSTRUCTIONS
Shoulder Pain  Many things can cause shoulder pain, including:  · An injury to the area.  · Overuse of the shoulder.  · Arthritis.    The source of the pain can be:  · Inflammation.  · An injury to the shoulder joint.  · An injury to a tendon, ligament, or bone.    Follow these instructions at home:  Take these actions to help with your pain:  · Squeeze a soft ball or a foam pad as much as possible. This helps to keep the shoulder from swelling. It also helps to strengthen the arm.  · Take over-the-counter and prescription medicines only as told by your health care provider.  · If directed, apply ice to the area:  ? Put ice in a plastic bag.  ? Place a towel between your skin and the bag.  ? Leave the ice on for 20 minutes, 2-3 times per day. Stop applying ice if it does not help with the pain.  · If you were given a shoulder sling or immobilizer:  ? Wear it as told.  ? Remove it to shower or bathe.  ? Move your arm as little as possible, but keep your hand moving to prevent swelling.    Contact a health care provider if:  · Your pain gets worse.  · Your pain is not relieved with medicines.  · New pain develops in your arm, hand, or fingers.  Get help right away if:  · Your arm, hand, or fingers:  ? Tingle.  ? Become numb.  ? Become swollen.  ? Become painful.  ? Turn white or blue.  This information is not intended to replace advice given to you by your health care provider. Make sure you discuss any questions you have with your health care provider.  Document Released: 09/27/2006 Document Revised: 08/13/2017 Document Reviewed: 04/11/2016  [employer] Interactive Patient Education © 2018 [employer]    Allergies, Adult  An allergy is when your body's defense system (immune system) overreacts to an otherwise harmless substance (allergen) that you breathe in or eat or something that touches your skin. When you come into contact with something that you are allergic to, your immune system produces certain proteins  (antibodies). These proteins cause cells to release chemicals (histamines) that trigger the symptoms of an allergic reaction.  Allergies often affect the nasal passages (allergic rhinitis), eyes (allergic conjunctivitis), skin (atopic dermatitis), and stomach. Allergies can be mild or severe. Allergies cannot spread from person to person (are not contagious). They can develop at any age and may be outgrown.  What increases the risk?  You may be at greater risk of allergies if other people in your family have allergies.  What are the signs or symptoms?  Symptoms depend on what type of allergy you have. They may include:  · Runny, stuffy nose.  · Sneezing.  · Itchy mouth, ears, or throat.  · Postnasal drip.  · Sore throat.  · Itchy, red, watery, or puffy eyes.  · Skin rash or hives.  · Stomach pain.  · Vomiting.  · Diarrhea.  · Bloating.  · Wheezing or coughing.    People with a severe allergy to food, medicine, or an insect bite may have a life-threatening allergic reaction (anaphylaxis). Symptoms of anaphylaxis include:  · Hives.  · Itching.  · Flushed face.  · Swollen lips, tongue, or mouth.  · Tight or swollen throat.  · Chest pain or tightness in the chest.  · Trouble breathing or shortness of breath.  · Rapid heartbeat.  · Dizziness or fainting.  · Vomiting.  · Diarrhea.  · Pain in the abdomen.    How is this diagnosed?  This condition is diagnosed based on:  · Your symptoms.  · Your family and medical history.  · A physical exam.    You may need to see a health care provider who specializes in treating allergies (allergist). You may also have tests, including:  · Skin tests to see which allergens are causing your symptoms, such as:  ? Skin prick test. In this test, your skin is pricked with a tiny needle and exposed to small amounts of possible allergens to see if your skin reacts.  ? Intradermal skin test. In this test, a small amount of allergen is injected under your skin to see if your skin reacts.  ? Patch  test. In this test, a small amount of allergen is placed on your skin and then your skin is covered with a bandage. Your health care provider will check your skin after a couple of days to see if a rash has developed.  · Blood tests.  · Challenges tests. In this test, you inhale a small amount of allergen by mouth to see if you have an allergic reaction.    You may also be asked to:  · Keep a food diary. A food diary is a record of all the foods and drinks you have in a day and any symptoms you experience.  · Practice an elimination diet. An elimination diet involves eliminating specific foods from your diet and then adding them back in one by one to find out if a certain food causes an allergic reaction.    How is this treated?  Treatment for allergies depends on your symptoms. Treatment may include:  · Cold compresses to soothe itching and swelling.  · Eye drops.  · Nasal sprays.  · Using a saline spray or container (neti pot) to flush out the nose (nasal irrigation). These methods can help clear away mucus and keep the nasal passages moist.  · Using a humidifier.  · Oral antihistamines or other medicines to block allergic reaction and inflammation.  · Skin creams to treat rashes or itching.  · Diet changes to eliminate food allergy triggers.  · Repeated exposure to tiny amounts of allergens to build up a tolerance and prevent future allergic reactions (immunotherapy). These include:  ? Allergy shots.  ? Oral treatment. This involves taking small doses of an allergen under the tongue (sublingual immunotherapy).  · Emergency epinephrine injection (auto-injector) in case of an allergic emergency. This is a self-injectable, pre-measured medicine that must be given within the first few minutes of a serious allergic reaction.    Follow these instructions at home:  · Avoid known allergens whenever possible.  · If you suffer from airborne allergens, wash out your nose daily. You can do this with a saline spray or a neti  pot to flush out your nose (nasal irrigation).  · Take over-the-counter and prescription medicines only as told by your health care provider.  · Keep all follow-up visits as told by your health care provider. This is important.  · If you are at risk of a severe allergic reaction (anaphylaxis), keep your auto-injector with you at all times.  · If you have ever had anaphylaxis, wear a medical alert bracelet or necklace that states you have a severe allergy.  Contact a health care provider if:  · Your symptoms do not improve with treatment.  Get help right away if:  · You have symptoms of anaphylaxis, such as:  ? Swollen mouth, tongue, or throat.  ? Pain or tightness in your chest.  ? Trouble breathing or shortness of breath.  ? Dizziness or fainting.  ? Severe abdominal pain, vomiting, or diarrhea.  This information is not intended to replace advice given to you by your health care provider. Make sure you discuss any questions you have with your health care provider.  Document Released: 03/12/2004 Document Revised: 04/18/2018 Document Reviewed: 07/05/2017  [employer] Interactive Patient Education © 2018 [employer]    Steps to Quit Smoking  Smoking tobacco can be harmful to your health and can affect almost every organ in your body. Smoking puts you, and those around you, at risk for developing many serious chronic diseases. Quitting smoking is difficult, but it is one of the best things that you can do for your health. It is never too late to quit.  What are the benefits of quitting smoking?  When you quit smoking, you lower your risk of developing serious diseases and conditions, such as:  · Lung cancer or lung disease, such as COPD.  · Heart disease.  · Stroke.  · Heart attack.  · Infertility.  · Osteoporosis and bone fractures.    Additionally, symptoms such as coughing, wheezing, and shortness of breath may get better when you quit. You may also find that you get sick less often because your body is stronger at  fighting off colds and infections. If you are pregnant, quitting smoking can help to reduce your chances of having a baby of low birth weight.  How do I get ready to quit?  When you decide to quit smoking, create a plan to make sure that you are successful. Before you quit:  · Pick a date to quit. Set a date within the next two weeks to give you time to prepare.  · Write down the reasons why you are quitting. Keep this list in places where you will see it often, such as on your bathroom mirror or in your car or wallet.  · Identify the people, places, things, and activities that make you want to smoke (triggers) and avoid them. Make sure to take these actions:  ? Throw away all cigarettes at home, at work, and in your car.  ? Throw away smoking accessories, such as ashtrays and lighters.  ? Clean your car and make sure to empty the ashtray.  ? Clean your home, including curtains and carpets.  · Tell your family, friends, and coworkers that you are quitting. Support from your loved ones can make quitting easier.  · Talk with your health care provider about your options for quitting smoking.  · Find out what treatment options are covered by your health insurance.    What strategies can I use to quit smoking?  Talk with your healthcare provider about different strategies to quit smoking. Some strategies include:  · Quitting smoking altogether instead of gradually lessening how much you smoke over a period of time. Research shows that quitting “cold turkey” is more successful than gradually quitting.  · Attending in-person counseling to help you build problem-solving skills. You are more likely to have success in quitting if you attend several counseling sessions. Even short sessions of 10 minutes can be effective.  · Finding resources and support systems that can help you to quit smoking and remain smoke-free after you quit. These resources are most helpful when you use them often. They can include:  ? Online chats with  a counselor.  ? Telephone quitlines.  ? Printed self-help materials.  ? Support groups or group counseling.  ? Text messaging programs.  ? Mobile phone applications.  · Taking medicines to help you quit smoking. (If you are pregnant or breastfeeding, talk with your health care provider first.) Some medicines contain nicotine and some do not. Both types of medicines help with cravings, but the medicines that include nicotine help to relieve withdrawal symptoms. Your health care provider may recommend:  ? Nicotine patches, gum, or lozenges.  ? Nicotine inhalers or sprays.  ? Non-nicotine medicine that is taken by mouth.    Talk with your health care provider about combining strategies, such as taking medicines while you are also receiving in-person counseling. Using these two strategies together makes you more likely to succeed in quitting than if you used either strategy on its own.  If you are pregnant or breastfeeding, talk with your health care provider about finding counseling or other support strategies to quit smoking. Do not take medicine to help you quit smoking unless told to do so by your health care provider.  What things can I do to make it easier to quit?  Quitting smoking might feel overwhelming at first, but there is a lot that you can do to make it easier. Take these important actions:  · Reach out to your family and friends and ask that they support and encourage you during this time. Call telephone quitlines, reach out to support groups, or work with a counselor for support.  · Ask people who smoke to avoid smoking around you.  · Avoid places that trigger you to smoke, such as bars, parties, or smoke-break areas at work.  · Spend time around people who do not smoke.  · Lessen stress in your life, because stress can be a smoking trigger for some people. To lessen stress, try:  ? Exercising regularly.  ? Deep-breathing exercises.  ? Yoga.  ? Meditating.  ? Performing a body scan. This involves closing  your eyes, scanning your body from head to toe, and noticing which parts of your body are particularly tense. Purposefully relax the muscles in those areas.  · Download or purchase mobile phone or tablet apps (applications) that can help you stick to your quit plan by providing reminders, tips, and encouragement. There are many free apps, such as QuitGuide from the CDC (Centers for Disease Control and Prevention). You can find other support for quitting smoking (smoking cessation) through smokefree.gov and other websites.    How will I feel when I quit smoking?  Within the first 24 hours of quitting smoking, you may start to feel some withdrawal symptoms. These symptoms are usually most noticeable 2-3 days after quitting, but they usually do not last beyond 2-3 weeks. Changes or symptoms that you might experience include:  · Mood swings.  · Restlessness, anxiety, or irritation.  · Difficulty concentrating.  · Dizziness.  · Strong cravings for sugary foods in addition to nicotine.  · Mild weight gain.  · Constipation.  · Nausea.  · Coughing or a sore throat.  · Changes in how your medicines work in your body.  · A depressed mood.  · Difficulty sleeping (insomnia).    After the first 2-3 weeks of quitting, you may start to notice more positive results, such as:  · Improved sense of smell and taste.  · Decreased coughing and sore throat.  · Slower heart rate.  · Lower blood pressure.  · Clearer skin.  · The ability to breathe more easily.  · Fewer sick days.    Quitting smoking is very challenging for most people. Do not get discouraged if you are not successful the first time. Some people need to make many attempts to quit before they achieve long-term success. Do your best to stick to your quit plan, and talk with your health care provider if you have any questions or concerns.  This information is not intended to replace advice given to you by your health care provider. Make sure you discuss any questions you have  with your health care provider.  Document Released: 12/12/2002 Document Revised: 08/15/2017 Document Reviewed: 05/03/2016  [employer] Interactive Patient Education © 2018 [employer]  Varenicline oral tablets  What is this medicine?  VARENICLINE (nicolle EN i kleen) is used to help people quit smoking. It can reduce the symptoms caused by stopping smoking. It is used with a patient support program recommended by your physician.  This medicine may be used for other purposes; ask your health care provider or pharmacist if you have questions.  COMMON BRAND NAME(S): Tanmayx  What should I tell my health care provider before I take this medicine?  They need to know if you have any of these conditions:  -bipolar disorder, depression, schizophrenia or other mental illness  -heart disease  -if you often drink alcohol  -kidney disease  -peripheral vascular disease  -seizures  -stroke  -suicidal thoughts, plans, or attempt; a previous suicide attempt by you or a family member  -an unusual or allergic reaction to varenicline, other medicines, foods, dyes, or preservatives  -pregnant or trying to get pregnant  -breast-feeding  How should I use this medicine?  Take this medicine by mouth after eating. Take with a full glass of water. Follow the directions on the prescription label. Take your doses at regular intervals. Do not take your medicine more often than directed.  There are 3 ways you can use this medicine to help you quit smoking; talk to your health care professional to decide which plan is right for you:  1) you can choose a quit date and start this medicine 1 week before the quit date, or,  2) you can start taking this medicine before you choose a quit date, and then pick a quit date between day 8 and 35 days of treatment, or,  3) if you are not sure that you are able or willing to quit smoking right away, start taking this medicine and slowly decrease the amount you smoke as directed by your health care professional with  the goal of being cigarette-free by week 12 of treatment.  Stick to your plan; ask about support groups or other ways to help you remain cigarette-free. If you are motivated to quit smoking and did not succeed during a previous attempt with this medicine for reasons other than side effects, or if you returned to smoking after this treatment, speak with your health care professional about whether another course of this medicine may be right for you.  A special MedGuide will be given to you by the pharmacist with each prescription and refill. Be sure to read this information carefully each time.  Talk to your pediatrician regarding the use of this medicine in children. This medicine is not approved for use in children.  Overdosage: If you think you have taken too much of this medicine contact a poison control center or emergency room at once.  NOTE: This medicine is only for you. Do not share this medicine with others.  What if I miss a dose?  If you miss a dose, take it as soon as you can. If it is almost time for your next dose, take only that dose. Do not take double or extra doses.  What may interact with this medicine?  -alcohol or any product that contains alcohol  -insulin  -other stop smoking aids  -theophylline  -warfarin  This list may not describe all possible interactions. Give your health care provider a list of all the medicines, herbs, non-prescription drugs, or dietary supplements you use. Also tell them if you smoke, drink alcohol, or use illegal drugs. Some items may interact with your medicine.  What should I watch for while using this medicine?  Visit your doctor or health care professional for regular check ups. Ask for ongoing advice and encouragement from your doctor or healthcare professional, friends, and family to help you quit. If you smoke while on this medication, quit again  Your mouth may get dry. Chewing sugarless gum or sucking hard candy, and drinking plenty of water may help. Contact  your doctor if the problem does not go away or is severe.  You may get drowsy or dizzy. Do not drive, use machinery, or do anything that needs mental alertness until you know how this medicine affects you. Do not stand or sit up quickly, especially if you are an older patient. This reduces the risk of dizzy or fainting spells.  Sleepwalking can happen during treatment with this medicine, and can sometimes lead to behavior that is harmful to you, other people, or property. Stop taking this medicine and tell your doctor if you start sleepwalking or have other unusual sleep-related activity.  Decrease the amount of alcoholic beverages that you drink during treatment with this medicine until you know if this medicine affects your ability to tolerate alcohol. Some people have experienced increased drunkenness (intoxication), unusual or sometimes aggressive behavior, or no memory of things that have happened (amnesia) during treatment with this medicine.  The use of this medicine may increase the chance of suicidal thoughts or actions. Pay special attention to how you are responding while on this medicine. Any worsening of mood, or thoughts of suicide or dying should be reported to your health care professional right away.  What side effects may I notice from receiving this medicine?  Side effects that you should report to your doctor or health care professional as soon as possible:  -allergic reactions like skin rash, itching or hives, swelling of the face, lips, tongue, or throat  -acting aggressive, being angry or violent, or acting on dangerous impulses  -breathing problems  -changes in vision  -chest pain or chest tightness  -confusion, trouble speaking or understanding  -new or worsening depression, anxiety, or panic attacks  -extreme increase in activity and talking (shagufta)  -fast, irregular heartbeat  -feeling faint or lightheaded, falls  -fever  -pain in legs when walking  -problems with balance, talking,  walking  -redness, blistering, peeling or loosening of the skin, including inside the mouth  -ringing in ears  -seeing or hearing things that aren't there (hallucinations)  -seizures  -sleepwalking  -sudden numbness or weakness of the face, arm or leg  -thoughts about suicide or dying, or attempts to commit suicide  -trouble passing urine or change in the amount of urine  -unusual bleeding or bruising  -unusually weak or tired  Side effects that usually do not require medical attention (report to your doctor or health care professional if they continue or are bothersome):  -constipation  -headache  -nausea, vomiting  -strange dreams  -stomach gas  -trouble sleeping  This list may not describe all possible side effects. Call your doctor for medical advice about side effects. You may report side effects to FDA at 5-643-FDA-9838.  Where should I keep my medicine?  Keep out of the reach of children.  Store at room temperature between 15 and 30 degrees C (59 and 86 degrees F). Throw away any unused medicine after the expiration date.  NOTE: This sheet is a summary. It may not cover all possible information. If you have questions about this medicine, talk to your doctor, pharmacist, or health care provider.  © 2018 [employer] (2016-09-01 16:14:23)

## 2018-09-21 DIAGNOSIS — J30.9 ALLERGIC RHINITIS, UNSPECIFIED CHRONICITY, UNSPECIFIED SEASONALITY, UNSPECIFIED TRIGGER: ICD-10-CM

## 2018-09-21 DIAGNOSIS — J45.30 MILD PERSISTENT ASTHMA, UNSPECIFIED WHETHER COMPLICATED: ICD-10-CM

## 2018-09-21 DIAGNOSIS — Z76.0 MEDICATION REFILL: ICD-10-CM

## 2018-09-22 RX ORDER — LORATADINE 10 MG/1
TABLET ORAL
Qty: 30 TABLET | Refills: 5 | Status: SHIPPED | OUTPATIENT
Start: 2018-09-22

## 2018-10-01 ENCOUNTER — OFFICE VISIT (OUTPATIENT)
Dept: PULMONOLOGY | Facility: CLINIC | Age: 33
End: 2018-10-01

## 2018-10-01 VITALS
DIASTOLIC BLOOD PRESSURE: 78 MMHG | WEIGHT: 228 LBS | TEMPERATURE: 98.6 F | BODY MASS INDEX: 33.77 KG/M2 | SYSTOLIC BLOOD PRESSURE: 120 MMHG | HEIGHT: 69 IN | HEART RATE: 72 BPM | OXYGEN SATURATION: 98 %

## 2018-10-01 DIAGNOSIS — J45.40 MODERATE PERSISTENT ASTHMA WITHOUT COMPLICATION: Primary | ICD-10-CM

## 2018-10-01 DIAGNOSIS — R91.1 PULMONARY NODULE: ICD-10-CM

## 2018-10-01 DIAGNOSIS — Z23 IMMUNIZATION DUE: ICD-10-CM

## 2018-10-01 DIAGNOSIS — Z72.0 TOBACCO ABUSE: ICD-10-CM

## 2018-10-01 PROCEDURE — 90674 CCIIV4 VAC NO PRSV 0.5 ML IM: CPT | Performed by: INTERNAL MEDICINE

## 2018-10-01 PROCEDURE — 99214 OFFICE O/P EST MOD 30 MIN: CPT | Performed by: INTERNAL MEDICINE

## 2018-10-01 PROCEDURE — 90471 IMMUNIZATION ADMIN: CPT | Performed by: INTERNAL MEDICINE

## 2018-10-01 PROCEDURE — 99406 BEHAV CHNG SMOKING 3-10 MIN: CPT | Performed by: INTERNAL MEDICINE

## 2018-10-01 RX ORDER — BUPROPION HYDROCHLORIDE 150 MG/1
TABLET ORAL
Refills: 0 | COMMUNITY
Start: 2018-09-13

## 2018-10-01 RX ORDER — SERTRALINE HYDROCHLORIDE 100 MG/1
TABLET, FILM COATED ORAL
Refills: 1 | COMMUNITY
Start: 2018-09-21 | End: 2018-10-01

## 2018-10-01 RX ORDER — VARENICLINE TARTRATE 1 MG/1
1 TABLET, FILM COATED ORAL 2 TIMES DAILY
Qty: 60 TABLET | Refills: 4 | Status: SHIPPED | OUTPATIENT
Start: 2018-10-01

## 2018-10-01 NOTE — PROGRESS NOTES
Pulmonary Office Follow Up      Subjective   Chief Complaint: Shortness of Breath    Jose Conway is a 32 y.o. male is being seen in follow up for Asthma, Pulmonary Nodules    History of Present Illness    Mr. Conway is a 33yo M who was referred to clinic for a pulmonary nodule. He reports that he had a CT scan of the chest performed secondary to shortness of breath and frequent episodes of bronchitis. A CT Chest performed on 9/16/17 showed a 7mm right upper lobe pulmonary nodule. He reports that he previously had a 5mm RUL nodule discovered on a CT scan of the chest in 2015 at [facility] in [facility]. He was supposed to follow up with a repeat CT scan of the chest in 6 months but did not secondary to insurance reasons.      He was last seen in clinic on 4/19/18. At that time, he was continued on Breo for asthma and Singulair was added. Since then, he notes that his breathing has been better. He was seen by [facility] and Spiriva Asthma was added. He was also started on Nasacort. He had extensive allergy testing and reports that he is allergic to pollens/grasses as well as cockroaches and dust mites. He does live in an apartment with carpet and a stringer problem. He recently got a new house without carpet that is bug free.     Since he was last seen, he was started on Chantix for smoking cessation. He is tolerating this without significant side effects. He has been on Chantix for approximately 2 weeks and has only smoked 2 cigarettes in the past 24 hours. He would like to continue to take Chantix.     He had a repeat CT scan of the chest done in August and returns for the results.      The following portions of the patient's history were reviewed and updated as appropriate: allergies, current medications, past family history, past medical history, past social history, past surgical history and problem list.    Review of Systems   Constitutional: Negative.    HENT: Positive for  "congestion, postnasal drip, sinus pressure, sneezing and tinnitus.    Eyes: Positive for redness.   Respiratory: Positive for cough, shortness of breath and wheezing.    Cardiovascular: Negative.    Gastrointestinal: Positive for constipation, diarrhea, nausea and vomiting.   Endocrine: Positive for cold intolerance.   Genitourinary: Negative.    Musculoskeletal: Positive for arthralgias and back pain.   Skin: Negative.    Allergic/Immunologic: Positive for environmental allergies.   Hematological: Negative.    Psychiatric/Behavioral: Positive for dysphoric mood. The patient is nervous/anxious.          Objective   Blood pressure 120/78, pulse 72, temperature 98.6 °F (37 °C), height 175.3 cm (69\"), weight 103 kg (228 lb), SpO2 98 %.  Physical Exam   Constitutional: He is oriented to person, place, and time. He appears well-developed and well-nourished. No distress.   HENT:   Head: Normocephalic and atraumatic.   Mouth/Throat: Oropharynx is clear and moist.   Eyes: Pupils are equal, round, and reactive to light. Conjunctivae are normal. No scleral icterus.   Neck: Normal range of motion. Neck supple. No tracheal deviation present. No thyromegaly present.   Cardiovascular: Normal rate, regular rhythm and normal heart sounds.    Pulmonary/Chest: Effort normal and breath sounds normal. No respiratory distress.   Abdominal: Soft. Bowel sounds are normal. There is no tenderness.   Musculoskeletal: Normal range of motion. He exhibits no edema.   Lymphadenopathy:     He has no cervical adenopathy.   Neurological: He is alert and oriented to person, place, and time. He exhibits normal muscle tone. Coordination normal.   Skin: Skin is warm and dry. No rash noted. No erythema.   Psychiatric: He has a normal mood and affect. His speech is normal and behavior is normal. Judgment normal.   Nursing note and vitals reviewed.      PFTs:  No new PFTs.     Imaging:  CT chest from 8/21/18 personally reviewed. The pulmonary nodules are " unchanged when compared to previous and unchanged when compared to 9/16/17.     Assessment/Plan   Jose was seen today for shortness of breath and asthma.    Diagnoses and all orders for this visit:    Moderate persistent asthma without complication    Immunization due    Pulmonary nodule    Tobacco abuse    Other orders  -     Flucelvax Quad=>4Years (5781-3941)  -     varenicline (CHANTIX) 1 MG tablet; Take 1 tablet by mouth 2 (Two) Times a Day.        Discussion:  Mr. Conway is a 31yo M who is followed for management of a solitary pulmonary nodule as well as Asthma.      1. Right Upper Lobe Pulmonary Nodule  - He does have a history of smoking, but based on his age, he is low risk for a primary lung cancer.   - Repeat CT chest in August was stable.   - Plan to repeat CT scan of the chest in September 2019. If stable at that time, he will not require any further follow up.       2. Moderate Persistent Asthma  - Continue Breo and Spiriva Asthma.   - Continue Singulair and Nasacort.   - We discussed allergy prevention measures such as avoiding carpet, rugs, drapes and washing bedsheets at least once weekly in hot water.   - Flu shot given in clinic today.      3. Tobacco Abuse  - Using Chantix.   - 5 minutes spent on smoking cessation counseling.   - Prescription sent for Chantix maintenance pack.            I personally spent over half of a total 30 minutes face to face with the patient in counseling and discussion and/or coordination of care as described above.     Catherine Salmon, DO  Pulmonary and Critical Care Medicine

## 2018-10-03 ENCOUNTER — OFFICE VISIT (OUTPATIENT)
Dept: ORTHOPEDIC SURGERY | Facility: CLINIC | Age: 33
End: 2018-10-03

## 2018-10-03 VITALS — OXYGEN SATURATION: 98 % | WEIGHT: 223.11 LBS | HEART RATE: 82 BPM | HEIGHT: 69 IN | BODY MASS INDEX: 33.04 KG/M2

## 2018-10-03 DIAGNOSIS — G89.29 CHRONIC RIGHT SHOULDER PAIN: Primary | ICD-10-CM

## 2018-10-03 DIAGNOSIS — M25.532 LEFT WRIST PAIN: ICD-10-CM

## 2018-10-03 DIAGNOSIS — M75.91 SUPRASPINATUS TENDINITIS, RIGHT: ICD-10-CM

## 2018-10-03 DIAGNOSIS — M25.511 CHRONIC RIGHT SHOULDER PAIN: Primary | ICD-10-CM

## 2018-10-03 PROCEDURE — 99204 OFFICE O/P NEW MOD 45 MIN: CPT | Performed by: ORTHOPAEDIC SURGERY

## 2018-10-03 NOTE — PROGRESS NOTES
[facility] [place] [facility] Note    Subjective     Chief Complaint   Patient presents with   • Right Shoulder - Pain     Chronic Right Shoulder Pain        HPI      Jose Conway is a 32 y.o. male.  Planes of chronic right shoulder pain and acute left wrist pain.  Right shoulder pain has had since 2013.  He thinks his overuse in the .  Pain is for a 10 aching throbbing stabbing and shooting.  He's tried anti-inflammatories 3 months physical therapy he previously lost 110 pounds.  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  He is not Worker's Comp.        Past Medical History:   Diagnosis Date   • ADHD (attention deficit hyperactivity disorder)    • Allergic    • Anxiety    • Asthma    • Bipolar disease, manic (CMS/[facility])    • Cholelithiasis    • Depression    • GERD (gastroesophageal reflux disease)    • Peptic ulceration    • Pneumonia       Past Surgical History:   Procedure Laterality Date   • ADENOIDECTOMY     • CHOLECYSTECTOMY     • PYLOROPLASTY     • TONSILLECTOMY     • WISDOM TOOTH EXTRACTION        Family History   Problem Relation Age of Onset   • Pancreatitis Mother    • Diverticulitis Mother    • Depression Mother    • Anxiety disorder Mother    • Drug abuse Mother    • Hyperlipidemia Mother    • Cancer Father         leuk   • Diabetes Father    • Hyperlipidemia Father    • Diverticulitis Father    • Irritable bowel syndrome Paternal Grandfather      Social History     Social History   • Marital status: Single     Spouse name: N/A   • Number of children: N/A   • Years of education: N/A     Occupational History   • Not on file.     Social History Main Topics   • Smoking status: Current Some Day Smoker     Packs/day: 1.00     Years: 10.00     Types: Cigarettes   • Smokeless tobacco: Never Used   • Alcohol use Yes     3 Glasses of wine per week      Comment: OCC   • Drug use: No   • Sexual activity: Yes     Partners: Male     Birth control/  CLOSED. protection: Condom     Other Topics Concern   • Not on file     Social History Narrative   • No narrative on file      Current Outpatient Prescriptions on File Prior to Visit   Medication Sig Dispense Refill   • albuterol (PROVENTIL HFA;VENTOLIN HFA) 108 (90 Base) MCG/ACT inhaler Inhale 2 puffs Every 4 (Four) Hours As Needed for Wheezing or Shortness of Air. 6.7 g 3   • BREO ELLIPTA 100-25 MCG/INH aerosol powder  INHALE 1 PUFF BY MOUTH EVERY DAY. GARGLE AND SPIT AFTER PUFFS 1 each 5   • buPROPion XL (WELLBUTRIN XL) 150 MG 24 hr tablet TK 1 T PO QAM FOR 7 DAYS THEN TK 2 TS PO QAM  0   • clonazePAM (KlonoPIN) 0.5 MG tablet TK ONE T PO QD PRF SEVERE ANXIETY  0   • diclofenac (VOLTAREN) 1 % gel gel APPLY 4 GRAMS EXTERNALLY TO THE AFFECTED AREA THREE TIMES DAILY 100 g 0   • EPINEPHrine (EPIPEN 2-HUGO IJ) Inject  as directed.     • hydrOXYzine (ATARAX) 10 MG tablet TK 1 TO 3 TS PO Q 8 H PRA  2   • lamoTRIgine (LaMICtal) 100 MG tablet TK SS T QD  2   • loperamide (IMODIUM) 2 MG capsule Take 2 mg by mouth As Needed for Diarrhea.     • loratadine (CLARITIN) 10 MG tablet TAKE 1 TABLET BY MOUTH DAILY 30 tablet 5   • montelukast (SINGULAIR) 10 MG tablet Take 1 tablet by mouth Every Night. 30 tablet 11   • omeprazole (priLOSEC) 40 MG capsule Take 1 capsule by mouth Every Morning. Take first thing in the morning on an empty stomach.  Wait at least 30 min to 1hr before eating. 60 capsule 0   • propranolol (INDERAL) 10 MG tablet TK 1 T PO  QID PRF ANXIETY/PANIC  0   • Tiotropium Bromide Monohydrate (SPIRIVA RESPIMAT) 1.25 MCG/ACT aerosol solution inhaler Inhale Daily.     • varenicline (CHANTIX STARTING MONTH PAK) 0.5 MG X 11 & 1 MG X 42 tablet Take 0.5 mg one daily on days 1-2 and 0.5 mg twice daily on days 4-7. Then 1 mg twice daily for a total of 12 weeks. 53 tablet 0   • varenicline (CHANTIX) 1 MG tablet Take 1 tablet by mouth 2 (Two) Times a Day. 60 tablet 4     No current facility-administered medications on file prior to  "visit.       Allergies   Allergen Reactions   • Penicillins Anaphylaxis and Rash   • Other Hives     coconuts        The following portions of the patient's history were reviewed and updated as appropriate: allergies, current medications, past family history, past medical history, past social history, past surgical history and problem list.    Review of Systems   Constitutional: Negative.    HENT: Positive for congestion.    Eyes: Positive for redness and itching.   Respiratory: Positive for wheezing.    Cardiovascular: Negative.    Gastrointestinal: Negative.    Endocrine: Positive for cold intolerance and heat intolerance.   Genitourinary: Negative.    Musculoskeletal: Positive for arthralgias.   Skin: Negative.    Allergic/Immunologic: Positive for environmental allergies.   Neurological: Negative.    Hematological: Negative.    Psychiatric/Behavioral: The patient is nervous/anxious.         Objective      Physical Exam  Pulse 82   Ht 175.3 cm (69.02\")   Wt 101 kg (223 lb 1.7 oz)   SpO2 98%   BMI 32.93 kg/m²     Body mass index is 32.93 kg/m².        GENERAL APPEARANCE: awake, alert & oriented x 3, in no acute distress and well developed, well nourished  PSYCH: normal mood and affect  LUNGS:  breathing nonlabored, no wheezing  EYES: sclera anicteric, pupils equal  CARDIOVASCULAR: palpable pulses dorsalis pedis, palpable posterior tibial bilaterally. Capillary refill less than 2 seconds  INTEGUMENTARY: skin intact, no clubbing, cyanosis  NEUROLOGIC:  Normal Sensation and reflexes             Ortho Exam  Musculoskeletal   Upper Extremity   Right Shoulder     Inspection and Palpation:     Tenderness - none    Crepitus - none    Sensation is normal    Examination reveals no ecchymosis.      Strength and Tone:    Supraspinatus,  Infraspinatus - 5/5    Subscapularis - 5/5    Deltoid - 5/5     Range of Motion   Left shoulder:    Internal Rotation: ROM - T7    External Rotation: AROM - 80 degrees    Elevation through " flexion: AROM - 180 degrees    Right Shoulder:    Internal Rotation: ROM - T7    External Rotation: AROM - 80 degrees    Elevation through flexion: AROM - 180 degrees     Abduction -180 degrees     Instability   Right shoulder    Sulcus sign negative    Apprehension test negative    Ursula relocation test negative    Jerk test negative   Impingement   Right shoulder    Larsen impingement test positive    Neer impingement test positive   Functional Testing   Right shoulder    AC crossover adduction test negative    Abdominal compression test negative    Lift-off sign negative    Speed's test negative    Putnam's test negative    Horriblower's sign negative   Peripheral Vascular   Left Upper Extremity    No cyanotic nail beds    Pink nail beds and rapid capillary refill   Palpation    Radial Pulse - Bilaterally normal    Neurologic   Sensory: Light touch intact- Right and left hand    Left Upper Extremity    Left wrist extensors: 5/5    Left wrist flexors: 5/5    Left intrinsics: 5/5   Right Upper Extremity    Right wrist extensors: 5/5    Right wrist flexors: 5/5    Right intrinsics: 5/5    Musculoskeletal   Left Elbow    Forearm supination: AROM - 90 degrees    Forearm pronation: AROM - 90 degrees   Right Elbow    Forearm supination: AROM - 90 degrees    Forearm pronation: AROM - 90 degrees     Inspection and Palpation   Right Wrist      Tenderness - none    Swelling - none    Crepitus - none    Muscle tone - no atrophy   Left Wrist    Tenderness - none    Swelling - none    Crepitus - none    Muscle tone - no atrophy     ROJM:   Left Wrist    Flexion: AROM - 90 degrees    Extension: AROM - 90 degrees   Right Wrist    Flexion: AROM - 90 degrees    Extension: AROM - 90 degrees     Deformities, Malalignments, Discrepancies    None     Functional Testing   Right Wrist    Tinel's Sign negative    Phalen's Sign negative    Carpal Compression Test negative   Left Wrist    Tinel's Sign negative    Phalen's Sign  positive    Carpal Compression Test negative       Strength and Tone    Right  strength: good    Left  strength: good          Imaging/Studies  Imaging Results (last 7 days)     Procedure Component Value Units Date/Time    XR Shoulder 2+ View Right [[id number]] Resulted:  10/03/18 1330     Updated:  10/03/18 1330    Narrative:       Right Shoulder X-Ray  Indication: Pain  AP, scapular Y, and axillary lateral views    Findings:  No fracture  No bony lesion  Normal soft tissues  Normal joint spaces    No prior studies were available for comparison.            Assessment/Plan        ICD-10-CM ICD-9-CM   1. Chronic right shoulder pain M25.511 719.41    G89.29 338.29   2. Supraspinatus tendinitis, right M75.91 726.10   3. Left wrist pain M25.532 719.43       Orders Placed This Encounter   Procedures   • XR Shoulder 2+ View Right   • FL Contrast Injection CT / MRI   • MRI shoulder right arthrogram      I ordered an MRI arthrogram of the right shoulder to evaluate for labral tear.  He has failed 3 months of physical therapy and he has had this for 5 years.  I will treat the left wrist pain with a cockup wrist splint and anti-inflammatories.  He does not want a note for work prescriptions.  He'll follow-up after MRI.    Medical Decision Making  Management Options : over-the-counter medicine  Data/Risk: radiology tests and independent visualization of imaging, lab tests, or EMG/NCV    Fito Whyte MD  10/03/18  1:38 PM         EMR Dragon/Transcription disclaimer:  Much of this encounter note is an electronic transcription of spoken language to printed text. Electronic transcription of spoken language may permit erroneous, or at times, nonsensical words or phrases to be inadvertently transcribed. Although I have reviewed the note for such errors, some may still exist.

## 2018-10-17 ENCOUNTER — HOSPITAL ENCOUNTER (OUTPATIENT)
Dept: MRI IMAGING | Facility: HOSPITAL | Age: 33
Discharge: HOME | End: 2018-10-17
Attending: ORTHOPAEDIC SURGERY | Admitting: ORTHOPAEDIC SURGERY

## 2018-10-17 ENCOUNTER — HOSPITAL ENCOUNTER (OUTPATIENT)
Dept: GENERAL RADIOLOGY | Facility: HOSPITAL | Age: 33
Discharge: HOME | End: 2018-10-17
Attending: ORTHOPAEDIC SURGERY

## 2018-10-17 DIAGNOSIS — M75.91 SUPRASPINATUS TENDINITIS, RIGHT: ICD-10-CM

## 2018-10-17 PROCEDURE — 77002 NEEDLE LOCALIZATION BY XRAY: CPT

## 2018-10-17 PROCEDURE — 0 GADOBENATE DIMEGLUMINE 529 MG/ML SOLUTION: Performed by: ORTHOPAEDIC SURGERY

## 2018-10-17 PROCEDURE — 73222 MRI JOINT UPR EXTREM W/DYE: CPT

## 2018-10-17 PROCEDURE — A9577 INJ MULTIHANCE: HCPCS | Performed by: ORTHOPAEDIC SURGERY

## 2018-10-17 PROCEDURE — 25010000002 IOPAMIDOL 61 % SOLUTION: Performed by: ORTHOPAEDIC SURGERY

## 2018-10-17 RX ORDER — LIDOCAINE HYDROCHLORIDE 10 MG/ML
10 INJECTION, SOLUTION INFILTRATION; PERINEURAL ONCE
Status: COMPLETED | OUTPATIENT
Start: 2018-10-17 | End: 2018-10-17

## 2018-10-17 RX ADMIN — GADOBENATE DIMEGLUMINE 1 ML: 529 INJECTION, SOLUTION INTRAVENOUS at 14:51

## 2018-10-17 RX ADMIN — IOPAMIDOL 10 ML: 612 INJECTION, SOLUTION INTRAVENOUS at 14:51

## 2018-10-17 RX ADMIN — LIDOCAINE HYDROCHLORIDE 10 ML: 10 INJECTION, SOLUTION INFILTRATION; PERINEURAL at 14:51

## 2018-10-24 ENCOUNTER — OFFICE VISIT (OUTPATIENT)
Dept: ORTHOPEDIC SURGERY | Facility: CLINIC | Age: 33
End: 2018-10-24

## 2018-10-24 VITALS — WEIGHT: 229.72 LBS | HEIGHT: 69 IN | BODY MASS INDEX: 34.02 KG/M2 | OXYGEN SATURATION: 99 % | HEART RATE: 97 BPM

## 2018-10-24 DIAGNOSIS — S43.431D SUPERIOR GLENOID LABRUM LESION OF RIGHT SHOULDER, SUBSEQUENT ENCOUNTER: Primary | ICD-10-CM

## 2018-10-24 PROCEDURE — 99213 OFFICE O/P EST LOW 20 MIN: CPT | Performed by: ORTHOPAEDIC SURGERY

## 2018-10-24 NOTE — PROGRESS NOTES
[facility] [place] [facility] [place]    Subjective     Chief Complaint   Patient presents with   • Right Shoulder - Follow-up     Chronic Right Shoulder Pain  Post MRI        HPI      Jose Conway is a 32 y.o. male.  He is follow-up right shoulder MRI.  He's had pain for years.  His brother and father both had flap tears.  His pain is 5 out of 10 and aching.  He works in [employer]  at [employer]        Past Medical History:   Diagnosis Date   • ADHD (attention deficit hyperactivity disorder)    • Allergic    • Anxiety    • Asthma    • Bipolar disease, manic (CMS/HCC)    • Cholelithiasis    • Depression    • GERD (gastroesophageal reflux disease)    • Peptic ulceration    • Pneumonia       Past Surgical History:   Procedure Laterality Date   • ADENOIDECTOMY     • CHOLECYSTECTOMY     • PYLOROPLASTY     • TONSILLECTOMY     • WISDOM TOOTH EXTRACTION        Family History   Problem Relation Age of Onset   • Pancreatitis Mother    • Diverticulitis Mother    • Depression Mother    • Anxiety disorder Mother    • Drug abuse Mother    • Hyperlipidemia Mother    • Cancer Father         leuk   • Diabetes Father    • Hyperlipidemia Father    • Diverticulitis Father    • Irritable bowel syndrome Paternal Grandfather      Social History     Social History   • Marital status: Single     Spouse name: N/A   • Number of children: N/A   • Years of education: N/A     Occupational History   • Not on file.     Social History Main Topics   • Smoking status: Current Some Day Smoker     Packs/day: 1.00     Years: 10.00     Types: Cigarettes   • Smokeless tobacco: Never Used   • Alcohol use Yes     3 Glasses of wine per week      Comment: OCC   • Drug use: No   • Sexual activity: Yes     Partners: Male     Birth control/ protection: Condom     Other Topics Concern   • Not on file     Social History Narrative   • No narrative on file      Current Outpatient Prescriptions on File Prior to Visit   Medication Sig Dispense Refill   •  albuterol (PROVENTIL HFA;VENTOLIN HFA) 108 (90 Base) MCG/ACT inhaler Inhale 2 puffs Every 4 (Four) Hours As Needed for Wheezing or Shortness of Air. 6.7 g 3   • BREO ELLIPTA 100-25 MCG/INH aerosol powder  INHALE 1 PUFF BY MOUTH EVERY DAY. GARGLE AND SPIT AFTER PUFFS 1 each 5   • buPROPion XL (WELLBUTRIN XL) 150 MG 24 hr tablet TK 1 T PO QAM FOR 7 DAYS THEN TK 2 TS PO QAM  0   • clonazePAM (KlonoPIN) 0.5 MG tablet TK ONE T PO QD PRF SEVERE ANXIETY  0   • diclofenac (VOLTAREN) 1 % gel gel APPLY 4 GRAMS EXTERNALLY TO THE AFFECTED AREA THREE TIMES DAILY 100 g 0   • EPINEPHrine (EPIPEN 2-HUGO IJ) Inject  as directed.     • hydrOXYzine (ATARAX) 10 MG tablet TK 1 TO 3 TS PO Q 8 H PRA  2   • lamoTRIgine (LaMICtal) 100 MG tablet TK SS T QD  2   • loperamide (IMODIUM) 2 MG capsule Take 2 mg by mouth As Needed for Diarrhea.     • loratadine (CLARITIN) 10 MG tablet TAKE 1 TABLET BY MOUTH DAILY 30 tablet 5   • montelukast (SINGULAIR) 10 MG tablet Take 1 tablet by mouth Every Night. 30 tablet 11   • omeprazole (priLOSEC) 40 MG capsule Take 1 capsule by mouth Every Morning. Take first thing in the morning on an empty stomach.  Wait at least 30 min to 1hr before eating. 60 capsule 0   • propranolol (INDERAL) 10 MG tablet TK 1 T PO  QID PRF ANXIETY/PANIC  0   • Tiotropium Bromide Monohydrate (SPIRIVA RESPIMAT) 1.25 MCG/ACT aerosol solution inhaler Inhale Daily.     • varenicline (CHANTIX STARTING MONTH PAK) 0.5 MG X 11 & 1 MG X 42 tablet Take 0.5 mg one daily on days 1-2 and 0.5 mg twice daily on days 4-7. Then 1 mg twice daily for a total of 12 weeks. 53 tablet 0   • varenicline (CHANTIX) 1 MG tablet Take 1 tablet by mouth 2 (Two) Times a Day. 60 tablet 4     No current facility-administered medications on file prior to visit.       Allergies   Allergen Reactions   • Penicillins Anaphylaxis and Rash   • Other Hives     coconuts        The following portions of the patient's history were reviewed and updated as appropriate:  "allergies, current medications, past family history, past medical history, past social history, past surgical history and problem list.    Review of Systems   Constitutional: Negative.    HENT: Positive for congestion and sinus pressure.    Eyes: Positive for redness and itching.   Respiratory: Negative.    Cardiovascular: Negative.    Gastrointestinal: Negative.    Endocrine: Negative.    Genitourinary: Negative.    Musculoskeletal: Negative.         Shoulder Pain    Skin: Negative.    Allergic/Immunologic: Negative.    Neurological: Negative.    Hematological: Negative.    Psychiatric/Behavioral: The patient is nervous/anxious.         Objective      Physical Exam  Pulse 97   Ht 175.3 cm (69.02\")   Wt 104 kg (229 lb 11.5 oz)   SpO2 99%   BMI 33.91 kg/m²     Body mass index is 33.91 kg/m².        GENERAL APPEARANCE: awake, alert & oriented x 3, in no acute distress and well developed, well nourished  PSYCH: normal mood and affect      Ortho Exam  Musculoskeletal   Upper Extremity   Left Shoulder     Inspection and Palpation:     Tenderness - none     Crepitus - none    Sensation is normal    Examination reveals no ecchymosis.      Strength and Tone:    Supraspinatus, Infraspinatus - 5/5    Subscapularis - 5/5    Deltoid - 5/5   Range of Motion   Right shoulder:    Internal Rotation: ROM - T7    External Rotation: AROM - 80 degrees    Elevation through flexion: AROM - 180 degrees     Abduction - 180   Left Shoulder:    Internal Rotation: ROM - T7    External Rotation: AROM - 80 degrees    Elevation through flexion: AROM - 180 degrees     Abduction - 180 degrees   Instability   Left shoulder    Sulcus sign negative    Apprehension test negative    Ursula relocation test negative    Jerk test negative   Impingement   Left shoulder    Larsen impingement test positive    Neer impingement test positive   Functional Testing   Left shoulder    AC crossover adduction test negative    Abdominal compression test " negative    Lift-off sign negative    Speed's test negative    Putnam's test negative    Horriblower's sign negative     Imaging/Studies  Imaging Results (last 7 days)     ** No results found for the last 168 hours. **      I viewed his right shoulder MRI which shows a large SLAP tear type IIB   Assessment/Plan        ICD-10-CM ICD-9-CM   1. Superior glenoid labrum lesion of right shoulder, subsequent encounter S43.431D V58.89     840.7     The plan will be for a right shoulder arthroscopy with superior labrum repair.Treatment options and alternatives were discussed with patient.  Surgical versus non surgical treatment options were discussed as well.    We reviewed the nature of the planned procedure including the risks, benefits and potential complications.  Understanding was expressed and the decision was made to proceed with surgery.    Medical Decision Making  Management Options : over-the-counter medicine and major surgery with risk factors  Data/Risk: radiology tests and independent visualization of imaging, lab tests, or EMG/NCV    Fito Whyte MD  10/24/18  2:00 PM         EMR Dragon/Transcription disclaimer:  Much of this encounter note is an electronic transcription of spoken language to printed text. Electronic transcription of spoken language may permit erroneous, or at times, nonsensical words or phrases to be inadvertently transcribed. Although I have reviewed the note for such errors, some may still exist.

## 2018-10-29 ENCOUNTER — TELEPHONE (OUTPATIENT)
Dept: ORTHOPEDIC SURGERY | Facility: CLINIC | Age: 33
End: 2018-10-29

## 2018-11-05 ENCOUNTER — TELEPHONE (OUTPATIENT)
Dept: ORTHOPEDIC SURGERY | Facility: CLINIC | Age: 33
End: 2018-11-05

## 2018-11-05 NOTE — TELEPHONE ENCOUNTER
CALLED PATIENT TO ADVISE OF 6:30AM ARRIVAL TIME FOR SURGERY ON 11/6/18 WITH DR PONCE.  NO ANSWER, LEFT VOICEMAIL WITH DETAILS AND REQUEST TO RETURN CALL CONFIRMING RECEIPT OF MESSAGE.

## 2018-11-06 ENCOUNTER — OUTSIDE FACILITY SERVICE (OUTPATIENT)
Dept: ORTHOPEDIC SURGERY | Facility: CLINIC | Age: 33
End: 2018-11-06

## 2018-11-06 PROCEDURE — 29807 SHO ARTHRS SRG RPR SLAP LES: CPT | Performed by: ORTHOPAEDIC SURGERY

## 2018-11-09 ENCOUNTER — TELEPHONE (OUTPATIENT)
Dept: ORTHOPEDIC SURGERY | Facility: CLINIC | Age: 33
End: 2018-11-09

## 2018-11-09 NOTE — TELEPHONE ENCOUNTER
PT CALLED REQUESTING A RETURN TO WORK LETTER. HE RETURNED TO WORK TODAY AND WANTED TO MAKE SURE THAT WAS OK WITH DR. PONCE. LETTER CAN BE FAXED [phone].

## 2018-11-13 DIAGNOSIS — R12 HEARTBURN: ICD-10-CM

## 2018-11-13 RX ORDER — OMEPRAZOLE 40 MG/1
CAPSULE, DELAYED RELEASE ORAL
Qty: 60 CAPSULE | Refills: 0 | OUTPATIENT
Start: 2018-11-13

## 2018-11-14 ENCOUNTER — OFFICE VISIT (OUTPATIENT)
Dept: ORTHOPEDIC SURGERY | Facility: CLINIC | Age: 33
End: 2018-11-14

## 2018-11-14 DIAGNOSIS — S43.431D SUPERIOR GLENOID LABRUM LESION OF RIGHT SHOULDER, SUBSEQUENT ENCOUNTER: ICD-10-CM

## 2018-11-14 DIAGNOSIS — Z98.890 STATUS POST ARTHROSCOPY OF RIGHT SHOULDER: Primary | ICD-10-CM

## 2018-11-14 PROCEDURE — 99024 POSTOP FOLLOW-UP VISIT: CPT | Performed by: ORTHOPAEDIC SURGERY

## 2018-11-14 NOTE — PROGRESS NOTES
Chief Complaint   Patient presents with   • Post-op     1 week - Right Shoulder Arthroscopy with SLAP Repair  11/6/18           HPI  He is follow-up right shoulder scope with SLAP repair.  He is doing well.      There were no vitals filed for this visit.      Physical Exam:    His portals good.  He is neurovascular intact.    X-RAY REPORT:  Imaging Results (last 7 days)     ** No results found for the last 168 hours. **                ICD-10-CM ICD-9-CM   1. Status post arthroscopy of right shoulder Z98.890 V45.89   2. Superior glenoid labrum lesion of right shoulder, subsequent encounter S43.431D V58.89     840.7     He will follow-up in 3 weeks to start physical therapy.

## 2018-11-30 ENCOUNTER — TELEPHONE (OUTPATIENT)
Dept: ORTHOPEDIC SURGERY | Facility: CLINIC | Age: 33
End: 2018-11-30

## 2018-11-30 NOTE — TELEPHONE ENCOUNTER
Called sabino back- he states that there was a small scab on the from incision that he thinks came off and that is where the bleeding came from. He states also that he is not really having any discomfort. He is a little concerned about the numbness, but her is unable to come in today during the times that we have a provider here in the office. He is going to rest the arm and come in on Monday. He denies fever, chills, nightsweats. He understands to present to ER over the weekend if anything further presents. He had no further problems or questions at this time.       Kym

## 2018-11-30 NOTE — TELEPHONE ENCOUNTER
Patient called this morning in regards of his right shoulder that he had surgery on 11/06/2018 with Dr. Whyte. He claimed that he did too much activity yesterday and his incision slightly started to bleed last night before he got in the shower. Patient said that the bleeding has stopped bleeding but not is claiming to have some numbness in his bicep area. Patient does have a follow up appointment on Wednesday, December 5th but is unsure if he should make an earlier appointment.  Please advice patient if he needs to come in and he can be reached [phone].

## 2018-12-03 ENCOUNTER — OFFICE VISIT (OUTPATIENT)
Dept: ORTHOPEDIC SURGERY | Facility: CLINIC | Age: 33
End: 2018-12-03

## 2018-12-03 DIAGNOSIS — S43.431D SUPERIOR GLENOID LABRUM LESION OF RIGHT SHOULDER, SUBSEQUENT ENCOUNTER: ICD-10-CM

## 2018-12-03 DIAGNOSIS — Z98.890 STATUS POST ARTHROSCOPY OF RIGHT SHOULDER: Primary | ICD-10-CM

## 2018-12-03 PROCEDURE — 99024 POSTOP FOLLOW-UP VISIT: CPT | Performed by: ORTHOPAEDIC SURGERY

## 2018-12-03 NOTE — PROGRESS NOTES
Chief Complaint   Patient presents with   • Post-op     2.5 weeks - Right Shoulder Arthroscopy with [employer]  11/6/18  and  Superior glenoid labrum lesion of right shoulder           HPI  He was concerned that he had a bleeding from decision.  Look like a scab came off but it looks fine.      There were no vitals filed for this visit.      Physical Exam:  His portals look perfect.  There is no sign of infection.  He is neurovascular intact.        ICD-10-CM ICD-9-CM   1. Status post arthroscopy of right shoulder Z98.890 V45.89   2. Superior glenoid labrum lesion of right shoulder, subsequent encounter S43.431D V58.89     840.7       Orders Placed This Encounter   Procedures   • Ambulatory Referral to Physical Therapy   He will go to physical therapy.  His work restriction is no lifting of the right arm.  I'll see him back in 1 month.

## 2018-12-16 ENCOUNTER — LAB REQUISITION (OUTPATIENT)
Dept: LAB | Facility: HOSPITAL | Age: 33
End: 2018-12-16

## 2018-12-16 ENCOUNTER — OFFICE VISIT (OUTPATIENT)
Dept: FAMILY MEDICINE CLINIC | Facility: CLINIC | Age: 33
End: 2018-12-16

## 2018-12-16 VITALS
OXYGEN SATURATION: 96 % | HEART RATE: 90 BPM | TEMPERATURE: 98.3 F | BODY MASS INDEX: 32.84 KG/M2 | SYSTOLIC BLOOD PRESSURE: 102 MMHG | WEIGHT: 229.38 LBS | HEIGHT: 70 IN | DIASTOLIC BLOOD PRESSURE: 78 MMHG | RESPIRATION RATE: 18 BRPM

## 2018-12-16 DIAGNOSIS — K21.9 GASTROESOPHAGEAL REFLUX DISEASE, ESOPHAGITIS PRESENCE NOT SPECIFIED: Primary | ICD-10-CM

## 2018-12-16 DIAGNOSIS — Z00.00 ROUTINE GENERAL MEDICAL EXAMINATION AT A HEALTH CARE FACILITY: ICD-10-CM

## 2018-12-16 DIAGNOSIS — R11.2 NAUSEA AND VOMITING, INTRACTABILITY OF VOMITING NOT SPECIFIED, UNSPECIFIED VOMITING TYPE: ICD-10-CM

## 2018-12-16 DIAGNOSIS — Z20.2 EXPOSURE TO STD: ICD-10-CM

## 2018-12-16 DIAGNOSIS — Z00.00 HEALTHCARE MAINTENANCE: ICD-10-CM

## 2018-12-16 DIAGNOSIS — R36.0 ABNORMAL PENILE DISCHARGE, WITHOUT BLOOD: ICD-10-CM

## 2018-12-16 DIAGNOSIS — R19.7 DIARRHEA, UNSPECIFIED TYPE: ICD-10-CM

## 2018-12-16 DIAGNOSIS — R30.0 DYSURIA: ICD-10-CM

## 2018-12-16 DIAGNOSIS — R82.998 LEUKOCYTES IN URINE: ICD-10-CM

## 2018-12-16 PROBLEM — M25.519 SHOULDER PAIN: Status: ACTIVE | Noted: 2018-12-16

## 2018-12-16 LAB
ALBUMIN SERPL-MCNC: 4.87 G/DL (ref 3.2–4.8)
ALBUMIN/GLOB SERPL: 2.4 G/DL (ref 1.5–2.5)
ALP SERPL-CCNC: 79 U/L (ref 25–100)
ALT SERPL W P-5'-P-CCNC: 35 U/L (ref 7–40)
ANION GAP SERPL CALCULATED.3IONS-SCNC: 8 MMOL/L (ref 3–11)
ARTICHOKE IGE QN: 130 MG/DL (ref 0–130)
AST SERPL-CCNC: 20 U/L (ref 0–33)
BASOPHILS # BLD AUTO: 0.02 10*3/MM3 (ref 0–0.2)
BASOPHILS NFR BLD AUTO: 0.2 % (ref 0–1)
BILIRUB BLD-MCNC: NEGATIVE MG/DL
BILIRUB SERPL-MCNC: 1.1 MG/DL (ref 0.3–1.2)
BUN BLD-MCNC: 12 MG/DL (ref 9–23)
BUN/CREAT SERPL: 11.5 (ref 7–25)
CALCIUM SPEC-SCNC: 9.7 MG/DL (ref 8.7–10.4)
CHLORIDE SERPL-SCNC: 105 MMOL/L (ref 99–109)
CHOLEST SERPL-MCNC: 162 MG/DL (ref 0–200)
CLARITY, POC: CLEAR
CO2 SERPL-SCNC: 29 MMOL/L (ref 20–31)
COLOR UR: ABNORMAL
CREAT BLD-MCNC: 1.04 MG/DL (ref 0.6–1.3)
DEPRECATED RDW RBC AUTO: 42.3 FL (ref 37–54)
EOSINOPHIL # BLD AUTO: 0.07 10*3/MM3 (ref 0–0.3)
EOSINOPHIL NFR BLD AUTO: 0.7 % (ref 0–3)
ERYTHROCYTE [DISTWIDTH] IN BLOOD BY AUTOMATED COUNT: 12.6 % (ref 11.3–14.5)
GFR SERPL CREATININE-BSD FRML MDRD: 83 ML/MIN/1.73
GLOBULIN UR ELPH-MCNC: 2 GM/DL
GLUCOSE BLD-MCNC: 88 MG/DL (ref 70–100)
GLUCOSE UR STRIP-MCNC: NEGATIVE MG/DL
HAV IGM SERPL QL IA: NORMAL
HBV CORE IGM SERPL QL IA: NORMAL
HBV SURFACE AG SERPL QL IA: NORMAL
HCT VFR BLD AUTO: 48.1 % (ref 38.9–50.9)
HCV AB SER DONR QL: NORMAL
HDLC SERPL-MCNC: 36 MG/DL (ref 40–60)
HGB BLD-MCNC: 17.1 G/DL (ref 13.1–17.5)
HIV1+2 AB SER QL: NORMAL
IMM GRANULOCYTES # BLD: 0.02 10*3/MM3 (ref 0–0.03)
IMM GRANULOCYTES NFR BLD: 0.2 % (ref 0–0.6)
KETONES UR QL: NEGATIVE
LEUKOCYTE EST, POC: ABNORMAL
LYMPHOCYTES # BLD AUTO: 2.38 10*3/MM3 (ref 0.6–4.8)
LYMPHOCYTES NFR BLD AUTO: 23.8 % (ref 24–44)
MCH RBC QN AUTO: 32.7 PG (ref 27–31)
MCHC RBC AUTO-ENTMCNC: 35.6 G/DL (ref 32–36)
MCV RBC AUTO: 92 FL (ref 80–99)
MONOCYTES # BLD AUTO: 0.65 10*3/MM3 (ref 0–1)
MONOCYTES NFR BLD AUTO: 6.5 % (ref 0–12)
NEUTROPHILS # BLD AUTO: 6.88 10*3/MM3 (ref 1.5–8.3)
NEUTROPHILS NFR BLD AUTO: 68.8 % (ref 41–71)
NITRITE UR-MCNC: NEGATIVE MG/ML
PH UR: 7 [PH] (ref 5–8)
PLATELET # BLD AUTO: 160 10*3/MM3 (ref 150–450)
PMV BLD AUTO: 11.5 FL (ref 6–12)
POTASSIUM BLD-SCNC: 4.2 MMOL/L (ref 3.5–5.5)
PROT SERPL-MCNC: 6.9 G/DL (ref 5.7–8.2)
PROT UR STRIP-MCNC: NEGATIVE MG/DL
RBC # BLD AUTO: 5.23 10*6/MM3 (ref 4.2–5.76)
RBC # UR STRIP: ABNORMAL /UL
SODIUM BLD-SCNC: 142 MMOL/L (ref 132–146)
SP GR UR: 1.02 (ref 1–1.03)
TRIGL SERPL-MCNC: 112 MG/DL (ref 0–150)
TSH SERPL DL<=0.05 MIU/L-ACNC: 1.17 MIU/ML (ref 0.35–5.35)
UROBILINOGEN UR QL: NORMAL
WBC NRBC COR # BLD: 10 10*3/MM3 (ref 3.5–10.8)

## 2018-12-16 PROCEDURE — 99214 OFFICE O/P EST MOD 30 MIN: CPT | Performed by: NURSE PRACTITIONER

## 2018-12-16 PROCEDURE — G0432 EIA HIV-1/HIV-2 SCREEN: HCPCS | Performed by: NURSE PRACTITIONER

## 2018-12-16 PROCEDURE — 80053 COMPREHEN METABOLIC PANEL: CPT | Performed by: NURSE PRACTITIONER

## 2018-12-16 PROCEDURE — 84443 ASSAY THYROID STIM HORMONE: CPT | Performed by: NURSE PRACTITIONER

## 2018-12-16 PROCEDURE — 86592 SYPHILIS TEST NON-TREP QUAL: CPT | Performed by: NURSE PRACTITIONER

## 2018-12-16 PROCEDURE — 80061 LIPID PANEL: CPT | Performed by: NURSE PRACTITIONER

## 2018-12-16 PROCEDURE — 85025 COMPLETE CBC W/AUTO DIFF WBC: CPT | Performed by: NURSE PRACTITIONER

## 2018-12-16 PROCEDURE — 80074 ACUTE HEPATITIS PANEL: CPT | Performed by: NURSE PRACTITIONER

## 2018-12-16 PROCEDURE — 96372 THER/PROPH/DIAG INJ SC/IM: CPT | Performed by: NURSE PRACTITIONER

## 2018-12-16 PROCEDURE — 87086 URINE CULTURE/COLONY COUNT: CPT | Performed by: NURSE PRACTITIONER

## 2018-12-16 RX ORDER — AZITHROMYCIN 500 MG/1
1000 TABLET, FILM COATED ORAL ONCE
Qty: 2 TABLET | Refills: 0 | Status: SHIPPED | OUTPATIENT
Start: 2018-12-16 | End: 2018-12-16

## 2018-12-16 RX ORDER — CEFTRIAXONE SODIUM 250 MG/1
250 INJECTION, POWDER, FOR SOLUTION INTRAMUSCULAR; INTRAVENOUS ONCE
Status: COMPLETED | OUTPATIENT
Start: 2018-12-16 | End: 2018-12-16

## 2018-12-16 RX ORDER — PANTOPRAZOLE SODIUM 40 MG/1
40 TABLET, DELAYED RELEASE ORAL DAILY
Qty: 30 TABLET | Refills: 1 | Status: SHIPPED | OUTPATIENT
Start: 2018-12-16 | End: 2019-02-12 | Stop reason: SDUPTHER

## 2018-12-16 RX ORDER — METRONIDAZOLE 500 MG/1
500 TABLET ORAL 2 TIMES DAILY
Qty: 14 TABLET | Refills: 0 | Status: SHIPPED | OUTPATIENT
Start: 2018-12-16 | End: 2018-12-23

## 2018-12-16 RX ADMIN — CEFTRIAXONE SODIUM 250 MG: 250 INJECTION, POWDER, FOR SOLUTION INTRAMUSCULAR; INTRAVENOUS at 15:40

## 2018-12-16 NOTE — PATIENT INSTRUCTIONS
Gastroesophageal Reflux Disease, Adult  Normally, food travels down the esophagus and stays in the stomach to be digested. However, when a person has gastroesophageal reflux disease (GERD), food and stomach acid move back up into the esophagus. When this happens, the esophagus becomes sore and inflamed. Over time, GERD can create small holes (ulcers) in the lining of the esophagus.  What are the causes?  This condition is caused by a problem with the muscle between the esophagus and the stomach (lower esophageal sphincter, or LES). Normally, the LES muscle closes after food passes through the esophagus to the stomach. When the LES is weakened or abnormal, it does not close properly, and that allows food and stomach acid to go back up into the esophagus. The LES can be weakened by certain dietary substances, medicines, and medical conditions, including:  · Tobacco use.  · Pregnancy.  · Having a hiatal hernia.  · Heavy alcohol use.  · Certain foods and beverages, such as coffee, chocolate, onions, and peppermint.    What increases the risk?  This condition is more likely to develop in:  · People who have an increased body weight.  · People who have connective tissue disorders.  · People who use NSAID medicines.    What are the signs or symptoms?  Symptoms of this condition include:  · Heartburn.  · Difficult or painful swallowing.  · The feeling of having a lump in the throat.  · A bitter taste in the mouth.  · Bad breath.  · Having a large amount of saliva.  · Having an upset or bloated stomach.  · Belching.  · Chest pain.  · Shortness of breath or wheezing.  · Ongoing (chronic) cough or a night-time cough.  · Wearing away of tooth enamel.  · Weight loss.    Different conditions can cause chest pain. Make sure to see your health care provider if you experience chest pain.  How is this diagnosed?  Your health care provider will take a medical history and perform a physical exam. To determine if you have mild or severe  GERD, your health care provider may also monitor how you respond to treatment. You may also have other tests, including:  · An endoscopy to examine your stomach and esophagus with a small camera.  · A test that measures the acidity level in your esophagus.  · A test that measures how much pressure is on your esophagus.  · A barium swallow or modified barium swallow to show the shape, size, and functioning of your esophagus.    How is this treated?  The goal of treatment is to help relieve your symptoms and to prevent complications. Treatment for this condition may vary depending on how severe your symptoms are. Your health care provider may recommend:  · Changes to your diet.  · Medicine.  · Surgery.    Follow these instructions at home:  Diet  · Follow a diet as recommended by your health care provider. This may involve avoiding foods and drinks such as:  ? Coffee and tea (with or without caffeine).  ? Drinks that contain alcohol.  ? Energy drinks and sports drinks.  ? Carbonated drinks or sodas.  ? Chocolate and cocoa.  ? Peppermint and mint flavorings.  ? Garlic and onions.  ? Horseradish.  ? Spicy and acidic foods, including peppers, chili powder, harris powder, vinegar, hot sauces, and barbecue sauce.  ? Citrus fruit juices and citrus fruits, such as oranges, noemy, and limes.  ? Tomato-based foods, such as red sauce, chili, salsa, and pizza with red sauce.  ? Fried and fatty foods, such as donuts, french fries, potato chips, and high-fat dressings.  ? High-fat meats, such as hot dogs and fatty cuts of red and white meats, such as rib eye steak, sausage, ham, and iraheta.  ? High-fat dairy items, such as whole milk, butter, and cream cheese.  · Eat small, frequent meals instead of large meals.  · Avoid drinking large amounts of liquid with your meals.  · Avoid eating meals during the 2-3 hours before bedtime.  · Avoid lying down right after you eat.  · Do not exercise right after you eat.  General  instructions  · Pay attention to any changes in your symptoms.  · Take over-the-counter and prescription medicines only as told by your health care provider. Do not take aspirin, ibuprofen, or other NSAIDs unless your health care provider told you to do so.  · Do not use any tobacco products, including cigarettes, chewing tobacco, and e-cigarettes. If you need help quitting, ask your health care provider.  · Wear loose-fitting clothing. Do not wear anything tight around your waist that causes pressure on your abdomen.  · Raise (elevate) the head of your bed 6 inches (15cm).  · Try to reduce your stress, such as with yoga or meditation. If you need help reducing stress, ask your health care provider.  · If you are overweight, reduce your weight to an amount that is healthy for you. Ask your health care provider for guidance about a safe weight loss goal.  · Keep all follow-up visits as told by your health care provider. This is important.  Contact a health care provider if:  · You have new symptoms.  · You have unexplained weight loss.  · You have difficulty swallowing, or it hurts to swallow.  · You have wheezing or a persistent cough.  · Your symptoms do not improve with treatment.  · You have a hoarse voice.  Get help right away if:  · You have pain in your arms, neck, jaw, teeth, or back.  · You feel sweaty, dizzy, or light-headed.  · You have chest pain or shortness of breath.  · You vomit and your vomit looks like blood or coffee grounds.  · You faint.  · Your stool is bloody or black.  · You cannot swallow, drink, or eat.  This information is not intended to replace advice given to you by your health care provider. Make sure you discuss any questions you have with your health care provider.  Document Released: 09/27/2006 Document Revised: 05/17/2017 Document Reviewed: 04/13/2016  [employer] Interactive Patient Education © 2018 [employer]    Preventive Care 18-39 Years, Male  Preventive care refers to  lifestyle choices and visits with your health care provider that can promote health and wellness.  What does preventive care include?  · A yearly physical exam. This is also called an annual well check.  · Dental exams once or twice a year.  · Routine eye exams. Ask your health care provider how often you should have your eyes checked.  · Personal lifestyle choices, including:  ? Daily care of your teeth and gums.  ? Regular physical activity.  ? Eating a healthy diet.  ? Avoiding tobacco and drug use.  ? Limiting alcohol use.  ? Practicing safe sex.  What happens during an annual well check?  The services and screenings done by your health care provider during your annual well check will depend on your age, overall health, lifestyle risk factors, and family history of disease.  Counseling  Your health care provider may ask you questions about your:  · Alcohol use.  · Tobacco use.  · Drug use.  · Emotional well-being.  · Home and relationship well-being.  · Sexual activity.  · Eating habits.  · Work and work environment.    Screening  You may have the following tests or measurements:  · Height, weight, and BMI.  · Blood pressure.  · Lipid and cholesterol levels. These may be checked every 5 years starting at age 20.  · Diabetes screening. This is done by checking your blood sugar (glucose) after you have not eaten for a while (fasting).  · Skin check.  · Hepatitis C blood test.  · Hepatitis B blood test.  · Sexually transmitted disease (STD) testing.    Discuss your test results, treatment options, and if necessary, the need for more tests with your health care provider.  Vaccines  Your health care provider may recommend certain vaccines, such as:  · Influenza vaccine. This is recommended every year.  · Tetanus, diphtheria, and acellular pertussis (Tdap, Td) vaccine. You may need a Td booster every 10 years.  · Varicella vaccine. You may need this if you have not been vaccinated.  · HPV vaccine. If you are 26 or  younger, you may need three doses over 6 months.  · Measles, mumps, and rubella (MMR) vaccine. You may need at least one dose of MMR. You may also need a second dose.  · Pneumococcal 13-valent conjugate (PCV13) vaccine. You may need this if you have certain conditions and have not been vaccinated.  · Pneumococcal polysaccharide (PPSV23) vaccine. You may need one or two doses if you smoke cigarettes or if you have certain conditions.  · Meningococcal vaccine. One dose is recommended if you are age 19-21 years and a first-year college student living in a residence recinos, or if you have one of several medical conditions. You may also need additional booster doses.  · Hepatitis A vaccine. You may need this if you have certain conditions or if you travel or work in places where you may be exposed to hepatitis A.  · Hepatitis B vaccine. You may need this if you have certain conditions or if you travel or work in places where you may be exposed to hepatitis B.  · Haemophilus influenzae type b (Hib) vaccine. You may need this if you have certain risk factors.    Talk to your health care provider about which screenings and vaccines you need and how often you need them.  This information is not intended to replace advice given to you by your health care provider. Make sure you discuss any questions you have with your health care provider.  Document Released: 02/13/2003 Document Revised: 09/06/2017 Document Reviewed: 10/18/2016  [employer] Interactive Patient Education © 2018 [employer]  Sexually Transmitted Disease  A sexually transmitted disease (STD) is a disease or infection that may be passed (transmitted) from person to person, usually during sexual activity. This may happen by way of saliva, semen, blood, vaginal mucus, or urine. Common STDs include:  · Gonorrhea.  · Chlamydia.  · Syphilis.  · HIV and AIDS.  · Genital herpes.  · Hepatitis B and C.  · Trichomonas.  · Human papillomavirus (HPV).  · Pubic  lice.  · Scabies.  · Mites.  · Bacterial vaginosis.    What are the causes?  An STD may be caused by bacteria, a virus, or parasites. STDs are often transmitted during sexual activity if one person is infected. However, they may also be transmitted through nonsexual means. STDs may be transmitted after:  · Sexual intercourse with an infected person.  · Sharing sex toys with an infected person.  · Sharing needles with an infected person or using unclean piercing or tattoo needles.  · Having intimate contact with the genitals, mouth, or rectal areas of an infected person.  · Exposure to infected fluids during birth.    What are the signs or symptoms?  Different STDs have different symptoms. Some people may not have any symptoms. If symptoms are present, they may include:  · Painful or bloody urination.  · Pain in the pelvis, abdomen, vagina, anus, throat, or eyes.  · A skin rash, itching, or irritation.  · Growths, ulcerations, blisters, or sores in the genital and anal areas.  · Abnormal vaginal discharge with or without bad odor.  · Penile discharge in men.  · Fever.  · Pain or bleeding during sexual intercourse.  · Swollen glands in the groin area.  · Yellow skin and eyes (jaundice). This is seen with hepatitis.  · Swollen testicles.  · Infertility.  · Sores and blisters in the mouth.    How is this diagnosed?  To make a diagnosis, your health care provider may:  · Take a medical history.  · Perform a physical exam.  · Take a sample of any discharge to examine.  · Swab the throat, cervix, opening to the penis, rectum, or vagina for testing.  · Test a sample of your first morning urine.  · Perform blood tests.  · Perform a Pap test, if this applies.  · Perform a colposcopy.  · Perform a laparoscopy.    How is this treated?  Treatment depends on the STD. Some STDs may be treated but not cured.  · Chlamydia, gonorrhea, trichomonas, and syphilis can be cured with antibiotic medicine.  · Genital herpes, hepatitis, and  HIV can be treated, but not cured, with prescribed medicines. The medicines lessen symptoms.  · Genital warts from HPV can be treated with medicine or by freezing, burning (electrocautery), or surgery. Warts may come back.  · HPV cannot be cured with medicine or surgery. However, abnormal areas may be removed from the cervix, vagina, or vulva.  · If your diagnosis is confirmed, your recent sexual partners need treatment. This is true even if they are symptom-free or have a negative culture or evaluation. They should not have sex until their health care providers say it is okay.  · Your health care provider may test you for infection again 3 months after treatment.    How is this prevented?  Take these steps to reduce your risk of getting an STD:  · Use latex condoms, dental dams, and water-soluble lubricants during sexual activity. Do not use petroleum jelly or oils.  · Avoid having multiple sex partners.  · Do not have sex with someone who has other sex partners.  · Do not have sex with anyone you do not know or who is at high risk for an STD.  · Avoid risky sex practices that can break your skin.  · Do not have sex if you have open sores on your mouth or skin.  · Avoid drinking too much alcohol or taking illegal drugs. Alcohol and drugs can affect your judgment and put you in a vulnerable position.  · Avoid engaging in oral and anal sex acts.  · Get vaccinated for HPV and hepatitis. If you have not received these vaccines in the past, talk to your health care provider about whether one or both might be right for you.  · If you are at risk of being infected with HIV, it is recommended that you take a prescription medicine daily to prevent HIV infection. This is called pre-exposure prophylaxis (PrEP). You are considered at risk if:  ? You are a man who has sex with other men (MSM).  ? You are a heterosexual man or woman and are sexually active with more than one partner.  ? You take drugs by injection.  ? You are  sexually active with a partner who has HIV.  · Talk with your health care provider about whether you are at high risk of being infected with HIV. If you choose to begin PrEP, you should first be tested for HIV. You should then be tested every 3 months for as long as you are taking PrEP.    Contact a health care provider if:  · See your health care provider.  · Tell your sexual partner(s). They should be tested and treated for any STDs.  · Do not have sex until your health care provider says it is okay.  Get help right away if:  Contact your health care provider right away if:  · You have severe abdominal pain.  · You are a man and notice swelling or pain in your testicles.  · You are a woman and notice swelling or pain in your vagina.    This information is not intended to replace advice given to you by your health care provider. Make sure you discuss any questions you have with your health care provider.  Document Released: 03/09/2004 Document Revised: 07/07/2017 Document Reviewed: 07/08/2014  [employer] Interactive Patient Education © 2018 [employer]    Diarrhea, Adult  Diarrhea is frequent loose and watery bowel movements. Diarrhea can make you feel weak and cause you to become dehydrated. Dehydration can make you tired and thirsty, cause you to have a dry mouth, and decrease how often you urinate. Diarrhea typically lasts 2-3 days. However, it can last longer if it is a sign of something more serious. It is important to treat your diarrhea as told by your health care provider.  Follow these instructions at home:  Eating and drinking    Follow these recommendations as told by your health care provider:  · Take an oral rehydration solution (ORS). This is a drink that is sold at pharmacies and retail stores.  · Drink clear fluids, such as water, ice chips, diluted fruit juice, and low-calorie sports drinks.  · Eat bland, easy-to-digest foods in small amounts as you are able. These foods include bananas,  applesauce, rice, lean meats, toast, and crackers.  · Avoid drinking fluids that contain a lot of sugar or caffeine, such as energy drinks, sports drinks, and soda.  · Avoid alcohol.  · Avoid spicy or fatty foods.    General instructions  · Drink enough fluid to keep your urine clear or pale yellow.  · Wash your hands often. If soap and water are not available, use hand .  · Make sure that all people in your household wash their hands well and often.  · Take over-the-counter and prescription medicines only as told by your health care provider.  · Rest at home while you recover.  · Watch your condition for any changes.  · Take a warm bath to relieve any burning or pain from frequent diarrhea episodes.  · Keep all follow-up visits as told by your health care provider. This is important.  Contact a health care provider if:  · You have a fever.  · Your diarrhea gets worse.  · You have new symptoms.  · You cannot keep fluids down.  · You feel light-headed or dizzy.  · You have a headache  · You have muscle cramps.  Get help right away if:  · You have chest pain.  · You feel extremely weak or you faint.  · You have bloody or black stools or stools that look like tar.  · You have severe pain, cramping, or bloating in your abdomen.  · You have trouble breathing or you are breathing very quickly.  · Your heart is beating very quickly.  · Your skin feels cold and clammy.  · You feel confused.  · You have signs of dehydration, such as:  ? Dark urine, very little urine, or no urine.  ? Cracked lips.  ? Dry mouth.  ? Sunken eyes.  ? Sleepiness.  ? Weakness.  This information is not intended to replace advice given to you by your health care provider. Make sure you discuss any questions you have with your health care provider.  Document Released: 12/08/2003 Document Revised: 04/27/2017 Document Reviewed: 08/23/2016  [employer] Interactive Patient Education © 2018 [employer]    Nausea and Vomiting, Adult  Feeling sick to  your stomach (nausea) means that your stomach is upset or you feel like you have to throw up (vomit). Feeling more and more sick to your stomach can lead to throwing up. Throwing up happens when food and liquid from your stomach are thrown up and out the mouth. Throwing up can make you feel weak and cause you to get dehydrated. Dehydration can make you tired and thirsty, make you have a dry mouth, and make it so you pee (urinate) less often. Older adults and people with other diseases or a weak defense system (immune system) are at higher risk for dehydration. If you feel sick to your stomach or if you throw up, it is important to follow instructions from your doctor about how to take care of yourself.  Follow these instructions at home:  Eating and drinking  Follow these instructions as told by your doctor:  · Take an oral rehydration solution (ORS). This is a drink that is sold at pharmacies and stores.  · Drink clear fluids in small amounts as you are able, such as:  ? Water.  ? Ice chips.  ? Diluted fruit juice.  ? Low-calorie sports drinks.  · Eat bland, easy-to-digest foods in small amounts as you are able, such as:  ? Bananas.  ? Applesauce.  ? Rice.  ? Low-fat (lean) meats.  ? Toast.  ? Crackers.  · Avoid fluids that have a lot of sugar or caffeine in them.  · Avoid alcohol.  · Avoid spicy or fatty foods.    General instructions  · Drink enough fluid to keep your pee (urine) clear or pale yellow.  · Wash your hands often. If you cannot use soap and water, use hand .  · Make sure that all people in your home wash their hands well and often.  · Take over-the-counter and prescription medicines only as told by your doctor.  · Rest at home while you get better.  · Watch your condition for any changes.  · Breathe slowly and deeply when you feel sick to your stomach.  · Keep all follow-up visits as told by your doctor. This is important.  Contact a doctor if:  · You have a fever.  · You cannot keep fluids  down.  · Your symptoms get worse.  · You have new symptoms.  · You feel sick to your stomach for more than two days.  · You feel light-headed or dizzy.  · You have a headache.  · You have muscle cramps.  Get help right away if:  · You have pain in your chest, neck, arm, or jaw.  · You feel very weak or you pass out (faint).  · You throw up again and again.  · You see blood in your throw-up.  · Your throw-up looks like black coffee grounds.  · You have bloody or black poop (stools) or poop that look like tar.  · You have a very bad headache, a stiff neck, or both.  · You have a rash.  · You have very bad pain, cramping, or bloating in your belly (abdomen).  · You have trouble breathing.  · You are breathing very quickly.  · Your heart is beating very quickly.  · Your skin feels cold and clammy.  · You feel confused.  · You have pain when you pee.  · You have signs of dehydration, such as:  ? Dark pee, hardly any pee, or no pee.  ? Cracked lips.  ? Dry mouth.  ? Sunken eyes.  ? Sleepiness.  ? Weakness.  These symptoms may be an emergency. Do not wait to see if the symptoms will go away. Get medical help right away. Call your local emergency services (911 in the [place] Do not drive yourself to the hospital.  This information is not intended to replace advice given to you by your health care provider. Make sure you discuss any questions you have with your health care provider.  Document Released: 06/05/2009 Document Revised: 07/07/2017 Document Reviewed: 08/23/2016  [employer] Interactive Patient Education © 2018 [employer]  Azithromycin tablets  What is this medicine?  AZITHROMYCIN (az ith jovanni MYE sin) is a macrolide antibiotic. It is used to treat or prevent certain kinds of bacterial infections. It will not work for colds, flu, or other viral infections.  This medicine may be used for other purposes; ask your health care provider or pharmacist if you have questions.  COMMON BRAND NAME(S): Zithromax, Zithromax  Tri-Mike, Zithromax Z-Mike  What should I tell my health care provider before I take this medicine?  They need to know if you have any of these conditions:  -kidney disease  -liver disease  -irregular heartbeat or heart disease  -an unusual or allergic reaction to azithromycin, erythromycin, other macrolide antibiotics, foods, dyes, or preservatives  -pregnant or trying to get pregnant  -breast-feeding  How should I use this medicine?  Take this medicine by mouth with a full glass of water. Follow the directions on the prescription label. The tablets can be taken with food or on an empty stomach. If the medicine upsets your stomach, take it with food. Take your medicine at regular intervals. Do not take your medicine more often than directed. Take all of your medicine as directed even if you think your are better. Do not skip doses or stop your medicine early.  Talk to your pediatrician regarding the use of this medicine in children. While this drug may be prescribed for children as young as 6 months for selected conditions, precautions do apply.  Overdosage: If you think you have taken too much of this medicine contact a poison control center or emergency room at once.  NOTE: This medicine is only for you. Do not share this medicine with others.  What if I miss a dose?  If you miss a dose, take it as soon as you can. If it is almost time for your next dose, take only that dose. Do not take double or extra doses.  What may interact with this medicine?  Do not take this medicine with any of the following medications:  -lincomycin  This medicine may also interact with the following medications:  -amiodarone  -antacids  -birth control pills  -cyclosporine  -digoxin  -magnesium  -nelfinavir  -phenytoin  -warfarin  This list may not describe all possible interactions. Give your health care provider a list of all the medicines, herbs, non-prescription drugs, or dietary supplements you use. Also tell them if you smoke, drink  alcohol, or use illegal drugs. Some items may interact with your medicine.  What should I watch for while using this medicine?  Tell your doctor or healthcare professional if your symptoms do not start to get better or if they get worse.  Do not treat diarrhea with over the counter products. Contact your doctor if you have diarrhea that lasts more than 2 days or if it is severe and watery.  This medicine can make you more sensitive to the sun. Keep out of the sun. If you cannot avoid being in the sun, wear protective clothing and use sunscreen. Do not use sun lamps or tanning beds/booths.  What side effects may I notice from receiving this medicine?  Side effects that you should report to your doctor or health care professional as soon as possible:  -allergic reactions like skin rash, itching or hives, swelling of the face, lips, or tongue  -confusion, nightmares or hallucinations  -dark urine  -difficulty breathing  -hearing loss  -irregular heartbeat or chest pain  -pain or difficulty passing urine  -redness, blistering, peeling or loosening of the skin, including inside the mouth  -white patches or sores in the mouth  -yellowing of the eyes or skin  Side effects that usually do not require medical attention (report to your doctor or health care professional if they continue or are bothersome):  -diarrhea  -dizziness, drowsiness  -headache  -stomach upset or vomiting  -tooth discoloration  -vaginal irritation  This list may not describe all possible side effects. Call your doctor for medical advice about side effects. You may report side effects to FDA at 4-659-FDA-2121.  Where should I keep my medicine?  Keep out of the reach of children.  Store at room temperature between 15 and 30 degrees C (59 and 86 degrees F). Throw away any unused medicine after the expiration date.  NOTE: This sheet is a summary. It may not cover all possible information. If you have questions about this medicine, talk to your doctor,  pharmacist, or health care provider.  © 2018 [employer] (2017 15:26:03)  Ceftriaxone injection  What is this medicine?  CEFTRIAXONE (sef try AX one) is a cephalosporin antibiotic. It is used to treat certain kinds of bacterial infections. It will not work for colds, flu, or other viral infections.  This medicine may be used for other purposes; ask your health care provider or pharmacist if you have questions.  COMMON BRAND NAME(S): Luz  What should I tell my health care provider before I take this medicine?  They need to know if you have any of these conditions:  -any chronic illness  -bowel disease, like colitis  -both kidney and liver disease  -high bilirubin level in  patients  -an unusual or allergic reaction to ceftriaxone, other cephalosporin or penicillin antibiotics, foods, dyes, or preservatives  -pregnant or trying to get pregnant  -breast-feeding  How should I use this medicine?  This medicine is injected into a muscle or infused it into a vein. It is usually given in a medical office or clinic. If you are to give this medicine you will be taught how to inject it. Follow instructions carefully. Use your doses at regular intervals. Do not take your medicine more often than directed. Do not skip doses or stop your medicine early even if you feel better. Do not stop taking except on your doctor's advice.  Talk to your pediatrician regarding the use of this medicine in children. Special care may be needed.  Overdosage: If you think you have taken too much of this medicine contact a poison control center or emergency room at once.  NOTE: This medicine is only for you. Do not share this medicine with others.  What if I miss a dose?  If you miss a dose, take it as soon as you can. If it is almost time for your next dose, take only that dose. Do not take double or extra doses.  What may interact with this medicine?  Do not take this medicine with any of the following  medications:  -intravenous calcium  This medicine may also interact with the following medications:  -birth control pills  This list may not describe all possible interactions. Give your health care provider a list of all the medicines, herbs, non-prescription drugs, or dietary supplements you use. Also tell them if you smoke, drink alcohol, or use illegal drugs. Some items may interact with your medicine.  What should I watch for while using this medicine?  Tell your doctor or health care professional if your symptoms do not improve or if they get worse.  Do not treat diarrhea with over the counter products. Contact your doctor if you have diarrhea that lasts more than 2 days or if it is severe and watery.  If you are being treated for a sexually transmitted disease, avoid sexual contact until you have finished your treatment. Having sex can infect your sexual partner.  Calcium may bind to this medicine and cause lung or kidney problems. Avoid calcium products while taking this medicine and for 48 hours after taking the last dose of this medicine.  What side effects may I notice from receiving this medicine?  Side effects that you should report to your doctor or health care professional as soon as possible:  -allergic reactions like skin rash, itching or hives, swelling of the face, lips, or tongue  -breathing problems  -fever, chills  -irregular heartbeat  -pain when passing urine  -seizures  -stomach pain, cramps  -unusual bleeding, bruising  -unusually weak or tired  Side effects that usually do not require medical attention (report to your doctor or health care professional if they continue or are bothersome):  -diarrhea  -dizzy, drowsy  -headache  -nausea, vomiting  -pain, swelling, irritation where injected  -stomach upset  -sweating  This list may not describe all possible side effects. Call your doctor for medical advice about side effects. You may report side effects to FDA at 9-617-FDA-5521.  Where should  I keep my medicine?  Keep out of the reach of children.  Store at room temperature below 25 degrees C (77 degrees F). Protect from light. Throw away any unused vials after the expiration date.  NOTE: This sheet is a summary. It may not cover all possible information. If you have questions about this medicine, talk to your doctor, pharmacist, or health care provider.  © 2018 [employer] (2015-07-06 09:14:54)  Metronidazole tablets or capsules  What is this medicine?  METRONIDAZOLE (me troe NI da zole) is an antiinfective. It is used to treat certain kinds of bacterial and protozoal infections. It will not work for colds, flu, or other viral infections.  This medicine may be used for other purposes; ask your health care provider or pharmacist if you have questions.  COMMON BRAND NAME(S): Flagyl  What should I tell my health care provider before I take this medicine?  They need to know if you have any of these conditions:  -anemia or other blood disorders  -disease of the nervous system  -fungal or yeast infection  -if you drink alcohol containing drinks  -liver disease  -seizures  -an unusual or allergic reaction to metronidazole, or other medicines, foods, dyes, or preservatives  -pregnant or trying to get pregnant  -breast-feeding  How should I use this medicine?  Take this medicine by mouth with a full glass of water. Follow the directions on the prescription label. Take your medicine at regular intervals. Do not take your medicine more often than directed. Take all of your medicine as directed even if you think you are better. Do not skip doses or stop your medicine early.  Talk to your pediatrician regarding the use of this medicine in children. Special care may be needed.  Overdosage: If you think you have taken too much of this medicine contact a poison control center or emergency room at once.  NOTE: This medicine is only for you. Do not share this medicine with others.  What if I miss a dose?  If  you miss a dose, take it as soon as you can. If it is almost time for your next dose, take only that dose. Do not take double or extra doses.  What may interact with this medicine?  Do not take this medicine with any of the following medications:  -alcohol or any product that contains alcohol  -amprenavir oral solution  -cisapride  -disulfiram  -dofetilide  -dronedarone  -paclitaxel injection  -pimozide  -ritonavir oral solution  -sertraline oral solution  -sulfamethoxazole-trimethoprim injection  -thioridazine  -ziprasidone  This medicine may also interact with the following medications:  -birth control pills  -cimetidine  -lithium  -other medicines that prolong the QT interval (cause an abnormal heart rhythm)  -phenobarbital  -phenytoin  -warfarin  This list may not describe all possible interactions. Give your health care provider a list of all the medicines, herbs, non-prescription drugs, or dietary supplements you use. Also tell them if you smoke, drink alcohol, or use illegal drugs. Some items may interact with your medicine.  What should I watch for while using this medicine?  Tell your doctor or health care professional if your symptoms do not improve or if they get worse.  You may get drowsy or dizzy. Do not drive, use machinery, or do anything that needs mental alertness until you know how this medicine affects you. Do not stand or sit up quickly, especially if you are an older patient. This reduces the risk of dizzy or fainting spells.  Avoid alcoholic drinks while you are taking this medicine and for three days afterward. Alcohol may make you feel dizzy, sick, or flushed.  If you are being treated for a sexually transmitted disease, avoid sexual contact until you have finished your treatment. Your sexual partner may also need treatment.  What side effects may I notice from receiving this medicine?  Side effects that you should report to your doctor or health care professional as soon as  possible:  -allergic reactions like skin rash or hives, swelling of the face, lips, or tongue  -confusion, clumsiness  -difficulty speaking  -discolored or sore mouth  -dizziness  -fever, infection  -numbness, tingling, pain or weakness in the hands or feet  -trouble passing urine or change in the amount of urine  -redness, blistering, peeling or loosening of the skin, including inside the mouth  -seizures  -unusually weak or tired  -vaginal irritation, dryness, or discharge  Side effects that usually do not require medical attention (report to your doctor or health care professional if they continue or are bothersome):  -diarrhea  -headache  -irritability  -metallic taste  -nausea  -stomach pain or cramps  -trouble sleeping  This list may not describe all possible side effects. Call your doctor for medical advice about side effects. You may report side effects to FDA at 5-920-FDA-1465.  Where should I keep my medicine?  Keep out of the reach of children.  Store at room temperature below 25 degrees C (77 degrees F). Protect from light. Keep container tightly closed. Throw away any unused medicine after the expiration date.  NOTE: This sheet is a summary. It may not cover all possible information. If you have questions about this medicine, talk to your doctor, pharmacist, or health care provider.  © 2018 [employer] Standard (2014-07-25 14:08:39)  Pantoprazole tablets  What is this medicine?  PANTOPRAZOLE (pan TOE pra zole) prevents the production of acid in the stomach. It is used to treat gastroesophageal reflux disease (GERD), inflammation of the esophagus, and Zollinger-Forrest syndrome.  This medicine may be used for other purposes; ask your health care provider or pharmacist if you have questions.  COMMON BRAND NAME(S): Protonix  What should I tell my health care provider before I take this medicine?  They need to know if you have any of these conditions:  -liver disease  -low levels of magnesium in the  blood  -lupus  -an unusual or allergic reaction to omeprazole, lansoprazole, pantoprazole, rabeprazole, other medicines, foods, dyes, or preservatives  -pregnant or trying to get pregnant  -breast-feeding  How should I use this medicine?  Take this medicine by mouth. Swallow the tablets whole with a drink of water. Follow the directions on the prescription label. Do not crush, break, or chew. Take your medicine at regular intervals. Do not take your medicine more often than directed.  Talk to your pediatrician regarding the use of this medicine in children. While this drug may be prescribed for children as young as 5 years for selected conditions, precautions do apply.  Overdosage: If you think you have taken too much of this medicine contact a poison control center or emergency room at once.  NOTE: This medicine is only for you. Do not share this medicine with others.  What if I miss a dose?  If you miss a dose, take it as soon as you can. If it is almost time for your next dose, take only that dose. Do not take double or extra doses.  What may interact with this medicine?  Do not take this medicine with any of the following medications:  -atazanavir  -nelfinavir  This medicine may also interact with the following medications:  -ampicillin  -delavirdine  -erlotinib  -iron salts  -medicines for fungal infections like ketoconazole, itraconazole and voriconazole  -methotrexate  -mycophenolate mofetil  -warfarin  This list may not describe all possible interactions. Give your health care provider a list of all the medicines, herbs, non-prescription drugs, or dietary supplements you use. Also tell them if you smoke, drink alcohol, or use illegal drugs. Some items may interact with your medicine.  What should I watch for while using this medicine?  It can take several days before your stomach pain gets better. Check with your doctor or health care professional if your condition does not start to get better, or if it gets  worse.  You may need blood work done while you are taking this medicine.  What side effects may I notice from receiving this medicine?  Side effects that you should report to your doctor or health care professional as soon as possible:  -allergic reactions like skin rash, itching or hives, swelling of the face, lips, or tongue  -bone, muscle or joint pain  -breathing problems  -chest pain or chest tightness  -dark yellow or brown urine  -dizziness  -fast, irregular heartbeat  -feeling faint or lightheaded  -fever or sore throat  -muscle spasm  -palpitations  -rash on cheeks or arms that gets worse in the sun  -redness, blistering, peeling or loosening of the skin, including inside the mouth  -seizures  -tremors  -unusual bleeding or bruising  -unusually weak or tired  -yellowing of the eyes or skin  Side effects that usually do not require medical attention (report to your doctor or health care professional if they continue or are bothersome):  -constipation  -diarrhea  -dry mouth  -headache  -nausea  This list may not describe all possible side effects. Call your doctor for medical advice about side effects. You may report side effects to FDA at 1-140-FDA-8627.  Where should I keep my medicine?  Keep out of the reach of children.  Store at room temperature between 15 and 30 degrees C (59 and 86 degrees F). Protect from light and moisture. Throw away any unused medicine after the expiration date.  NOTE: This sheet is a summary. It may not cover all possible information. If you have questions about this medicine, talk to your doctor, pharmacist, or health care provider.  © 2018 [employer] Standard (2017-01-19 12:20:19)    Safe Sex  Practicing safe sex means taking steps before and during sex to reduce your risk of:  · Getting an STD (sexually transmitted disease).  · Giving your partner an STD.  · Unwanted pregnancy.    How can I practice safe sex?    To practice safe sex:  · Limit your sexual partners to only one  partner who is having sex with only you.  · Avoid using alcohol and recreational drugs before having sex. These substances can affect your judgment.  · Before having sex with a new partner:  ? Talk to your partner about past partners, past STDs, and drug use.  ? You and your partner should be screened for STDs and discuss the results with each other.  · Check your body regularly for sores, blisters, rashes, or unusual discharge. If you notice any of these problems, visit your health care provider.  · If you have symptoms of an infection or you are being treated for an STD, avoid sexual contact.  · While having sex, use a condom. Make sure to:  ? Use a condom every time you have vaginal, oral, or anal sex. Both females and males should wear condoms during oral sex.  ? Keep condoms in place from the beginning to the end of sexual activity.  ? Use a latex condom, if possible. Latex condoms offer the best protection.  ? Use only water-based lubricants or oils to lubricate a condom. Using petroleum-based lubricants or oils will weaken the condom and increase the chance that it will break.  · See your health care provider for regular screenings, exams, and tests for STDs.  · Talk with your health care provider about the form of birth control (contraception) that is best for you.  · Get vaccinated against hepatitis B and human papillomavirus (HPV).  · If you are at risk of being infected with HIV (human immunodeficiency virus), talk with your health care provider about taking a prescription medicine to prevent HIV infection. You are considered at risk for HIV if:  ? You are a man who has sex with other men.  ? You are a heterosexual man or woman who is sexually active with more than one partner.  ? You take drugs by injection.  ? You are sexually active with a partner who has HIV.    This information is not intended to replace advice given to you by your health care provider. Make sure you discuss any questions you have  with your health care provider.  Document Released: 01/25/2006 Document Revised: 05/03/2017 Document Reviewed: 11/06/2016  [employer] Interactive Patient Education © 2018 [employer]  Emtricitabine; Tenofovir disoproxil fumarate tablets  What is this medicine?  EMTRICITABINE; TENOFOVIR DISOPROXIL FUMARATE (em tri SIT uh bean; te CORA adam) is two antiretroviral medicines in one tablet. It is used with other medicines to treat HIV and with safe sex practices to prevent HIV in high risk persons. This medicine is not a cure for HIV.  This medicine may be used for other purposes; ask your health care provider or pharmacist if you have questions.  COMMON BRAND NAME(S): Tona  What should I tell my health care provider before I take this medicine?  They need to know if you have any of these conditions:  -bone problems  -kidney disease  -liver disease  -an unusual or allergic reaction to emtricitabine, tenofovir, other medicines, foods, dyes, or preservatives  -pregnant or trying to get pregnant  -breast-feeding  How should I use this medicine?  Take this medicine by mouth with a glass of water. Swallow tablets whole; do not cut, crush or chew. Follow the directions on the prescription label. You can take it with or without food. If it upsets your stomach, take it with food. Take your medicine at regular intervals. Do not take your medicine more often than directed. For your anti-HIV therapy to work as well as possible, take each dose exactly as prescribed. Do not skip doses or stop your medicine even if you feel better. Skipping doses may make the HIV virus resistant to this medicine and other medicines. Do not stop taking except on your doctor's advice.  Talk to your pediatrician regarding the use of this medicine in children. While this drug may be prescribed for selected conditions, precautions do apply.  Overdosage: If you think you have taken too much of this medicine contact a poison control center or emergency  room at once.  NOTE: This medicine is only for you. Do not share this medicine with others.  What if I miss a dose?  If you miss a dose, take it as soon as you can. If it is almost time for your next dose, take only that dose. Do not take double or extra doses.  What may interact with this medicine?  Do not take this medicine with any of the following medications:  -adefovir  -any medicine that contains lamivudine  -any medicine that contains emtricitabine or tenofovir  This medicine may also interact with the following medications:  -atazanavir  -didanosine, ddI  -lopinavir; ritonavir  -medicines for viral infections like cidofovir, acyclovir, valacyclovir, ganciclovir, valganciclovir  -saquinavir  This list may not describe all possible interactions. Give your health care provider a list of all the medicines, herbs, non-prescription drugs, or dietary supplements you use. Also tell them if you smoke, drink alcohol, or use illegal drugs. Some items may interact with your medicine.  What should I watch for while using this medicine?  Visit your doctor or health care professional for regular check ups. Discuss any new symptoms with your doctor. You will need to have important blood work done while on this medicine.  HIV is spread to others through sexual or blood contact. Talk to your doctor about how to stop the spread of HIV.  If you have hepatitis B, talk to your doctor if you plan to stop this medicine. The symptoms of hepatitis B may get worse if you stop this medicine.  What side effects may I notice from receiving this medicine?  Side effects that you should report to your doctor or health care professional as soon as possible:  -allergic reactions like skin rash, itching or hives, swelling of the face, lips, or tongue  -breathing difficulties  -dark urine  -general ill feeling or flu-like symptoms  -light-colored stools  -loss of appetite  -nausea, vomiting, unusual stomach upset or pain  -right upper belly  pain  -trouble passing urine or change in the amount of urine  -unusually weak or tired  -yellowing of the eyes or skin  Side effects that usually do not require medical attention (report to your doctor or health care professional if they continue or are bothersome):  -diarrhea  -dizziness  -headache  -skin discoloration on the hands or feet  This list may not describe all possible side effects. Call your doctor for medical advice about side effects. You may report side effects to FDA at 4-160-VNR-3436.  Where should I keep my medicine?  Keep out of the reach of children.  Store at room temperature between 15 and 30 degrees C (59 and 86 degrees F). Throw away any unused medicine after the expiration date.  NOTE: This sheet is a summary. It may not cover all possible information. If you have questions about this medicine, talk to your doctor, pharmacist, or health care provider.  © 2018 [employer] (2017-04-25 09:46:32)

## 2018-12-17 LAB — RPR SER QL: NORMAL

## 2018-12-18 LAB — BACTERIA SPEC AEROBE CULT: NORMAL

## 2018-12-28 DIAGNOSIS — A54.9 GONORRHEA IN MALE: Primary | ICD-10-CM

## 2018-12-28 RX ORDER — CIPROFLOXACIN 500 MG/1
500 TABLET, FILM COATED ORAL DAILY
Qty: 1 TABLET | Refills: 0 | Status: SHIPPED | OUTPATIENT
Start: 2018-12-28 | End: 2018-12-29

## 2019-02-12 DIAGNOSIS — K21.9 GASTROESOPHAGEAL REFLUX DISEASE, ESOPHAGITIS PRESENCE NOT SPECIFIED: ICD-10-CM

## 2019-02-12 RX ORDER — PANTOPRAZOLE SODIUM 40 MG/1
40 TABLET, DELAYED RELEASE ORAL DAILY
Qty: 30 TABLET | Refills: 0 | Status: SHIPPED | OUTPATIENT
Start: 2019-02-12 | End: 2019-04-06 | Stop reason: SDUPTHER

## 2019-02-20 DIAGNOSIS — J45.30 MILD PERSISTENT ASTHMA, UNSPECIFIED WHETHER COMPLICATED: ICD-10-CM

## 2019-02-20 DIAGNOSIS — Z76.0 MEDICATION REFILL: ICD-10-CM

## 2019-02-20 RX ORDER — ALBUTEROL SULFATE 90 UG/1
AEROSOL, METERED RESPIRATORY (INHALATION)
Qty: 18 G | Refills: 0 | Status: SHIPPED | OUTPATIENT
Start: 2019-02-20 | End: 2020-01-21

## 2019-04-06 DIAGNOSIS — K21.9 GASTROESOPHAGEAL REFLUX DISEASE, ESOPHAGITIS PRESENCE NOT SPECIFIED: ICD-10-CM

## 2019-04-07 RX ORDER — PANTOPRAZOLE SODIUM 40 MG/1
40 TABLET, DELAYED RELEASE ORAL DAILY
Qty: 30 TABLET | Refills: 0 | Status: SHIPPED | OUTPATIENT
Start: 2019-04-07 | End: 2019-05-07 | Stop reason: SDUPTHER

## 2019-05-07 DIAGNOSIS — K21.9 GASTROESOPHAGEAL REFLUX DISEASE, ESOPHAGITIS PRESENCE NOT SPECIFIED: ICD-10-CM

## 2019-05-07 RX ORDER — MONTELUKAST SODIUM 10 MG/1
10 TABLET ORAL NIGHTLY
Qty: 30 TABLET | Refills: 0 | Status: SHIPPED | OUTPATIENT
Start: 2019-05-07 | End: 2019-09-16 | Stop reason: SDUPTHER

## 2019-05-07 RX ORDER — PANTOPRAZOLE SODIUM 40 MG/1
40 TABLET, DELAYED RELEASE ORAL DAILY
Qty: 30 TABLET | Refills: 0 | Status: SHIPPED | OUTPATIENT
Start: 2019-05-07

## 2019-07-12 DIAGNOSIS — K21.9 GASTROESOPHAGEAL REFLUX DISEASE, ESOPHAGITIS PRESENCE NOT SPECIFIED: ICD-10-CM

## 2019-07-12 RX ORDER — PANTOPRAZOLE SODIUM 40 MG/1
40 TABLET, DELAYED RELEASE ORAL DAILY
Qty: 30 TABLET | Refills: 0 | OUTPATIENT
Start: 2019-07-12

## 2019-09-16 RX ORDER — MONTELUKAST SODIUM 10 MG/1
10 TABLET ORAL NIGHTLY
Qty: 30 TABLET | Refills: 0 | Status: SHIPPED | OUTPATIENT
Start: 2019-09-16 | End: 2019-11-06 | Stop reason: SDUPTHER

## 2019-10-08 DIAGNOSIS — J30.9 ALLERGIC RHINITIS: ICD-10-CM

## 2019-10-09 RX ORDER — LORATADINE 10 MG/1
TABLET ORAL
Qty: 30 TABLET | Refills: 0 | OUTPATIENT
Start: 2019-10-09

## 2019-10-09 NOTE — TELEPHONE ENCOUNTER
Medication was previously prescribed by a provider who is no longer at this office. Patient would need to come in and be seen for a new prescription.

## 2019-11-06 RX ORDER — MONTELUKAST SODIUM 10 MG/1
10 TABLET ORAL NIGHTLY
Qty: 30 TABLET | Refills: 0 | Status: SHIPPED | OUTPATIENT
Start: 2019-11-06

## 2019-12-10 RX ORDER — MONTELUKAST SODIUM 10 MG/1
10 TABLET ORAL NIGHTLY
Qty: 30 TABLET | Refills: 0 | OUTPATIENT
Start: 2019-12-10

## 2020-01-07 DIAGNOSIS — R91.1 PULMONARY NODULE: Primary | ICD-10-CM

## 2020-01-10 ENCOUNTER — HOSPITAL ENCOUNTER (OUTPATIENT)
Dept: CT IMAGING | Facility: HOSPITAL | Age: 35
Discharge: HOME | End: 2020-01-10
Admitting: INTERNAL MEDICINE

## 2020-01-10 DIAGNOSIS — R91.1 PULMONARY NODULE: ICD-10-CM

## 2020-01-10 PROCEDURE — 71250 CT THORAX DX C-: CPT

## 2020-01-21 ENCOUNTER — TELEPHONE (OUTPATIENT)
Dept: PULMONOLOGY | Facility: CLINIC | Age: 35
End: 2020-01-21

## 2020-01-21 DIAGNOSIS — Z76.0 MEDICATION REFILL: ICD-10-CM

## 2020-01-21 DIAGNOSIS — J45.30 MILD PERSISTENT ASTHMA, UNSPECIFIED WHETHER COMPLICATED: ICD-10-CM

## 2020-01-21 RX ORDER — ALBUTEROL SULFATE 90 UG/1
AEROSOL, METERED RESPIRATORY (INHALATION)
Qty: 18 G | Refills: 0 | Status: SHIPPED | OUTPATIENT
Start: 2020-01-21

## 2020-01-21 NOTE — TELEPHONE ENCOUNTER
Attempted to call patient per Dr Salmon's request to let Mr. Conway know that his CT scan of the chest was stable and unchanged since previous scan. Dr Salmon will discuss further scanning for surveillance at his next apt with her on 2/4/20 at 230. LVM with apt date/time and office number.